# Patient Record
Sex: FEMALE | Race: WHITE | NOT HISPANIC OR LATINO | Employment: OTHER | URBAN - NONMETROPOLITAN AREA
[De-identification: names, ages, dates, MRNs, and addresses within clinical notes are randomized per-mention and may not be internally consistent; named-entity substitution may affect disease eponyms.]

---

## 2020-01-11 ENCOUNTER — APPOINTMENT (EMERGENCY)
Dept: RADIOLOGY | Facility: HOSPITAL | Age: 65
End: 2020-01-11
Payer: MEDICARE

## 2020-01-11 ENCOUNTER — HOSPITAL ENCOUNTER (EMERGENCY)
Facility: HOSPITAL | Age: 65
Discharge: HOME | End: 2020-01-11
Attending: EMERGENCY MEDICINE | Admitting: EMERGENCY MEDICINE
Payer: MEDICARE

## 2020-01-11 VITALS
DIASTOLIC BLOOD PRESSURE: 75 MMHG | HEART RATE: 59 BPM | SYSTOLIC BLOOD PRESSURE: 140 MMHG | HEIGHT: 59 IN | WEIGHT: 186 LBS | BODY MASS INDEX: 37.5 KG/M2 | OXYGEN SATURATION: 96 % | RESPIRATION RATE: 24 BRPM | TEMPERATURE: 97.2 F

## 2020-01-11 DIAGNOSIS — R06.02 SHORTNESS OF BREATH: Primary | ICD-10-CM

## 2020-01-11 LAB
ALBUMIN SERPL BCP-MCNC: 3.3 G/DL (ref 3.5–5)
ALP SERPL-CCNC: 68 U/L (ref 46–116)
ALT SERPL W P-5'-P-CCNC: 26 U/L (ref 12–78)
ANION GAP SERPL CALCULATED.3IONS-SCNC: 7 MMOL/L (ref 4–13)
AST SERPL W P-5'-P-CCNC: 14 U/L (ref 5–45)
BASOPHILS # BLD AUTO: 0.03 THOUSANDS/ΜL (ref 0–0.1)
BASOPHILS NFR BLD AUTO: 1 % (ref 0–1)
BILIRUB SERPL-MCNC: 0.55 MG/DL (ref 0.2–1)
BUN SERPL-MCNC: 15 MG/DL (ref 5–25)
CALCIUM SERPL-MCNC: 8 MG/DL (ref 8.3–10.1)
CHLORIDE SERPL-SCNC: 105 MMOL/L (ref 100–108)
CO2 SERPL-SCNC: 27 MMOL/L (ref 21–32)
CREAT SERPL-MCNC: 0.92 MG/DL (ref 0.6–1.3)
EOSINOPHIL # BLD AUTO: 0.3 THOUSAND/ΜL (ref 0–0.61)
EOSINOPHIL NFR BLD AUTO: 5 % (ref 0–6)
ERYTHROCYTE [DISTWIDTH] IN BLOOD BY AUTOMATED COUNT: 12.7 % (ref 11.6–15.1)
GFR SERPL CREATININE-BSD FRML MDRD: 66 ML/MIN/1.73SQ M
GLUCOSE SERPL-MCNC: 106 MG/DL (ref 65–140)
HCT VFR BLD AUTO: 39 % (ref 34.8–46.1)
HGB BLD-MCNC: 13 G/DL (ref 11.5–15.4)
IMM GRANULOCYTES # BLD AUTO: 0.03 THOUSAND/UL (ref 0–0.2)
IMM GRANULOCYTES NFR BLD AUTO: 1 % (ref 0–2)
LYMPHOCYTES # BLD AUTO: 1.18 THOUSANDS/ΜL (ref 0.6–4.47)
LYMPHOCYTES NFR BLD AUTO: 21 % (ref 14–44)
MCH RBC QN AUTO: 29.1 PG (ref 26.8–34.3)
MCHC RBC AUTO-ENTMCNC: 33.3 G/DL (ref 31.4–37.4)
MCV RBC AUTO: 87 FL (ref 82–98)
MONOCYTES # BLD AUTO: 0.41 THOUSAND/ΜL (ref 0.17–1.22)
MONOCYTES NFR BLD AUTO: 7 % (ref 4–12)
NEUTROPHILS # BLD AUTO: 3.6 THOUSANDS/ΜL (ref 1.85–7.62)
NEUTS SEG NFR BLD AUTO: 65 % (ref 43–75)
NRBC BLD AUTO-RTO: 0 /100 WBCS
PLATELET # BLD AUTO: 200 THOUSANDS/UL (ref 149–390)
PMV BLD AUTO: 11.2 FL (ref 8.9–12.7)
POTASSIUM SERPL-SCNC: 4.3 MMOL/L (ref 3.5–5.3)
PROT SERPL-MCNC: 6.4 G/DL (ref 6.4–8.2)
RBC # BLD AUTO: 4.47 MILLION/UL (ref 3.81–5.12)
SODIUM SERPL-SCNC: 139 MMOL/L (ref 136–145)
TROPONIN I SERPL-MCNC: <0.02 NG/ML
WBC # BLD AUTO: 5.55 THOUSAND/UL (ref 4.31–10.16)

## 2020-01-11 PROCEDURE — 93005 ELECTROCARDIOGRAM TRACING: CPT

## 2020-01-11 PROCEDURE — 99285 EMERGENCY DEPT VISIT HI MDM: CPT

## 2020-01-11 PROCEDURE — 71046 X-RAY EXAM CHEST 2 VIEWS: CPT

## 2020-01-11 PROCEDURE — 36415 COLL VENOUS BLD VENIPUNCTURE: CPT | Performed by: EMERGENCY MEDICINE

## 2020-01-11 PROCEDURE — 80053 COMPREHEN METABOLIC PANEL: CPT | Performed by: EMERGENCY MEDICINE

## 2020-01-11 PROCEDURE — 99285 EMERGENCY DEPT VISIT HI MDM: CPT | Performed by: EMERGENCY MEDICINE

## 2020-01-11 PROCEDURE — 85025 COMPLETE CBC W/AUTO DIFF WBC: CPT | Performed by: EMERGENCY MEDICINE

## 2020-01-11 PROCEDURE — 84484 ASSAY OF TROPONIN QUANT: CPT | Performed by: EMERGENCY MEDICINE

## 2020-01-11 RX ORDER — MONTELUKAST SODIUM 10 MG/1
TABLET ORAL
COMMUNITY
Start: 2019-11-13

## 2020-01-11 RX ORDER — LEVOTHYROXINE SODIUM 112 UG/1
TABLET ORAL
COMMUNITY
Start: 2019-07-23

## 2020-01-11 RX ORDER — ALBUTEROL SULFATE 90 UG/1
AEROSOL, METERED RESPIRATORY (INHALATION)
COMMUNITY
Start: 2019-11-13

## 2020-01-11 RX ORDER — BENZONATATE 100 MG/1
CAPSULE ORAL
COMMUNITY
Start: 2019-11-19

## 2020-01-11 RX ORDER — PANTOPRAZOLE SODIUM 40 MG/1
40 TABLET, DELAYED RELEASE ORAL DAILY
COMMUNITY
Start: 2020-01-08 | End: 2021-01-07

## 2020-01-11 RX ORDER — ATORVASTATIN CALCIUM 20 MG/1
TABLET, FILM COATED ORAL
COMMUNITY
Start: 2012-03-16

## 2020-01-11 RX ORDER — BUDESONIDE AND FORMOTEROL FUMARATE DIHYDRATE 160; 4.5 UG/1; UG/1
2 AEROSOL RESPIRATORY (INHALATION) 2 TIMES DAILY
COMMUNITY
Start: 2020-01-08

## 2020-01-11 RX ORDER — CLONIDINE HYDROCHLORIDE 0.1 MG/1
TABLET ORAL
COMMUNITY
Start: 2018-01-30

## 2020-01-11 RX ORDER — CETIRIZINE HYDROCHLORIDE 10 MG/1
TABLET ORAL
COMMUNITY

## 2020-01-11 NOTE — DISCHARGE INSTRUCTIONS
/75 (BP Location: Left arm)   Pulse 59   Temp (!) 97 2 °F (36 2 °C) (Temporal)   Resp (!) 24   Ht 4' 11" (1 499 m)   Wt 84 4 kg (186 lb)   SpO2 96%   BMI 37 57 kg/m²     Unsure pathology for your shortness of breath at this time  You work up is unremarkable here  Please follow up as an outpatient with your pulmonologist's orders  May consider changing CT without contrast to with       Results for orders placed or performed during the hospital encounter of 01/11/20   CBC and differential   Result Value Ref Range    WBC 5 55 4 31 - 10 16 Thousand/uL    RBC 4 47 3 81 - 5 12 Million/uL    Hemoglobin 13 0 11 5 - 15 4 g/dL    Hematocrit 39 0 34 8 - 46 1 %    MCV 87 82 - 98 fL    MCH 29 1 26 8 - 34 3 pg    MCHC 33 3 31 4 - 37 4 g/dL    RDW 12 7 11 6 - 15 1 %    MPV 11 2 8 9 - 12 7 fL    Platelets 541 663 - 034 Thousands/uL    nRBC 0 /100 WBCs    Neutrophils Relative 65 43 - 75 %    Immat GRANS % 1 0 - 2 %    Lymphocytes Relative 21 14 - 44 %    Monocytes Relative 7 4 - 12 %    Eosinophils Relative 5 0 - 6 %    Basophils Relative 1 0 - 1 %    Neutrophils Absolute 3 60 1 85 - 7 62 Thousands/µL    Immature Grans Absolute 0 03 0 00 - 0 20 Thousand/uL    Lymphocytes Absolute 1 18 0 60 - 4 47 Thousands/µL    Monocytes Absolute 0 41 0 17 - 1 22 Thousand/µL    Eosinophils Absolute 0 30 0 00 - 0 61 Thousand/µL    Basophils Absolute 0 03 0 00 - 0 10 Thousands/µL   Comprehensive metabolic panel   Result Value Ref Range    Sodium 139 136 - 145 mmol/L    Potassium 4 3 3 5 - 5 3 mmol/L    Chloride 105 100 - 108 mmol/L    CO2 27 21 - 32 mmol/L    ANION GAP 7 4 - 13 mmol/L    BUN 15 5 - 25 mg/dL    Creatinine 0 92 0 60 - 1 30 mg/dL    Glucose 106 65 - 140 mg/dL    Calcium 8 0 (L) 8 3 - 10 1 mg/dL    AST 14 5 - 45 U/L    ALT 26 12 - 78 U/L    Alkaline Phosphatase 68 46 - 116 U/L    Total Protein 6 4 6 4 - 8 2 g/dL    Albumin 3 3 (L) 3 5 - 5 0 g/dL    Total Bilirubin 0 55 0 20 - 1 00 mg/dL    eGFR 66 ml/min/1 73sq m Troponin I   Result Value Ref Range    Troponin I <0 02 <=0 04 ng/mL     Xray is unremarkable as well as your EKG

## 2020-01-11 NOTE — ED PROVIDER NOTES
History  Chief Complaint   Patient presents with    Shortness of Breath     pt had a tooth removed in november and since the surgery her asthma has been acting up and she is unable to control the breathing  Had pulm function test was normal  increased fatigue     70 yo F presenting w/dyspnea  Has seen PCP for this several times as well as pulmonology this week  Has outpt testing ordered  Hx of asthma  History provided by:  Patient   used: No    Shortness of Breath   Severity:  Mild  Onset quality:  Sudden (the day after a tooth extraction where she had anesthesia)  Duration:  2 months  Timing:  Constant  Progression:  Unchanged  Chronicity:  Recurrent (hx of asthma states this is how she always feels w/flares)  Context: activity    Context: not animal exposure, not emotional upset, not fumes, not known allergens, not occupational exposure, not pollens, not smoke exposure, not strong odors, not URI and not weather changes    Relieved by: had steroids a month ago x 1 week sx resolved then returned 2 days later  Worsened by: Activity  Ineffective treatments: inhalers  Associated symptoms: no abdominal pain, no chest pain, no cough, no diaphoresis, no ear pain, no fever, no headaches, no hemoptysis, no neck pain, no PND, no rash, no sore throat, no sputum production, no syncope, no swollen glands, no vomiting and no wheezing        Prior to Admission Medications   Prescriptions Last Dose Informant Patient Reported? Taking?    albuterol (VENTOLIN HFA) 90 mcg/act inhaler   Yes Yes   Sig: Inhale   atorvastatin (LIPITOR) 20 mg tablet   Yes Yes   Sig: Take by mouth   benzonatate (TESSALON PERLES) 100 mg capsule   Yes Yes   budesonide-formoterol (SYMBICORT) 160-4 5 mcg/act inhaler   Yes Yes   Sig: Inhale 2 puffs 2 (two) times a day   cetirizine (ZYRTEC ALLERGY) 10 mg tablet   Yes No   Sig: Take by mouth   cloNIDine (CATAPRES) 0 1 mg tablet   Yes Yes   Sig: Take by mouth   levothyroxine 112 mcg tablet   Yes Yes   Sig: Take by mouth   montelukast (SINGULAIR) 10 mg tablet   Yes Yes   Sig: Take by mouth   pantoprazole (PROTONIX) 40 mg tablet   Yes Yes   Sig: Take 40 mg by mouth daily      Facility-Administered Medications: None       Past Medical History:   Diagnosis Date    Asthma     GERD (gastroesophageal reflux disease)     Hyperlipidemia     Hypertension        Past Surgical History:   Procedure Laterality Date    HYSTERECTOMY      TOOTH EXTRACTION         History reviewed  No pertinent family history  I have reviewed and agree with the history as documented  Social History     Tobacco Use    Smoking status: Never Smoker    Smokeless tobacco: Never Used   Substance Use Topics    Alcohol use: Yes     Comment: socially    Drug use: Not Currently        Review of Systems   Constitutional: Negative for diaphoresis and fever  HENT: Negative for ear pain and sore throat  Respiratory: Positive for shortness of breath  Negative for cough, hemoptysis, sputum production, choking, chest tightness, wheezing and stridor  Cardiovascular: Negative for chest pain, syncope and PND  Gastrointestinal: Negative for abdominal pain and vomiting  Genitourinary: Negative for dysuria and flank pain  Musculoskeletal: Negative for arthralgias, back pain, gait problem, joint swelling and neck pain  Skin: Negative for rash  Neurological: Negative for tremors, seizures, syncope, weakness, numbness and headaches  Psychiatric/Behavioral: Negative for agitation and behavioral problems  Physical Exam  Physical Exam   Constitutional: She is oriented to person, place, and time  She appears well-developed and well-nourished  Non-toxic appearance  She does not appear ill  HENT:   Head: Normocephalic and atraumatic  Mouth/Throat: Oropharynx is clear and moist    Eyes: Pupils are equal, round, and reactive to light  EOM are normal    Neck: Normal range of motion  Neck supple  No JVD present   No tracheal deviation present  Cardiovascular: Normal rate, regular rhythm and normal heart sounds  Pulmonary/Chest: Effort normal and breath sounds normal  No accessory muscle usage or stridor  No tachypnea  No respiratory distress  She has no decreased breath sounds  She has no wheezes  She has no rhonchi  She has no rales  She exhibits no tenderness, no bony tenderness, no crepitus and no swelling  Abdominal: Soft  Bowel sounds are normal  She exhibits no distension  There is no tenderness  Musculoskeletal:        Right lower leg: Normal         Left lower leg: Normal    Neurological: She is alert and oriented to person, place, and time  Skin: Skin is warm  Capillary refill takes less than 2 seconds  Psychiatric: She has a normal mood and affect   Her behavior is normal        Vital Signs  ED Triage Vitals [01/11/20 0934]   Temperature Pulse Respirations Blood Pressure SpO2   (!) 97 2 °F (36 2 °C) 67 16 140/75 98 %      Temp Source Heart Rate Source Patient Position - Orthostatic VS BP Location FiO2 (%)   Temporal Monitor -- Left arm --      Pain Score       No Pain           Vitals:    01/11/20 0934 01/11/20 1000 01/11/20 1100   BP: 140/75     Pulse: 67 58 59         Visual Acuity      ED Medications  Medications - No data to display    Diagnostic Studies  Results Reviewed     Procedure Component Value Units Date/Time    Troponin I [[id number]]  (Normal) Collected:  01/11/20 0948    Lab Status:  Final result Specimen:  Blood from Line, Venous Updated:  01/11/20 1021     Troponin I <0 02 ng/mL     Comprehensive metabolic panel [[id number]]  (Abnormal) Collected:  01/11/20 0948    Lab Status:  Final result Specimen:  Blood from Line, Venous Updated:  01/11/20 1019     Sodium 139 mmol/L      Potassium 4 3 mmol/L      Chloride 105 mmol/L      CO2 27 mmol/L      ANION GAP 7 mmol/L      BUN 15 mg/dL      Creatinine 0 92 mg/dL      Glucose 106 mg/dL      Calcium 8 0 mg/dL      AST 14 U/L      ALT 26 U/L Alkaline Phosphatase 68 U/L      Total Protein 6 4 g/dL      Albumin 3 3 g/dL      Total Bilirubin 0 55 mg/dL      eGFR 66 ml/min/1 73sq m     Narrative:       [place] guidelines for Chronic Kidney Disease (CKD):     Stage 1 with normal or high GFR (GFR > 90 mL/min/1 73 square meters)    Stage 2 Mild CKD (GFR = 60-89 mL/min/1 73 square meters)    Stage 3A Moderate CKD (GFR = 45-59 mL/min/1 73 square meters)    Stage 3B Moderate CKD (GFR = 30-44 mL/min/1 73 square meters)    Stage 4 Severe CKD (GFR = 15-29 mL/min/1 73 square meters)    Stage 5 End Stage CKD (GFR <15 mL/min/1 73 square meters)  Note: GFR calculation is accurate only with a steady state creatinine    CBC and differential [[id number]] Collected:  01/11/20 0948    Lab Status:  Final result Specimen:  Blood from Line, Venous Updated:  01/11/20 1004     WBC 5 55 Thousand/uL      RBC 4 47 Million/uL      Hemoglobin 13 0 g/dL      Hematocrit 39 0 %      MCV 87 fL      MCH 29 1 pg      MCHC 33 3 g/dL      RDW 12 7 %      MPV 11 2 fL      Platelets 335 Thousands/uL      nRBC 0 /100 WBCs      Neutrophils Relative 65 %      Immat GRANS % 1 %      Lymphocytes Relative 21 %      Monocytes Relative 7 %      Eosinophils Relative 5 %      Basophils Relative 1 %      Neutrophils Absolute 3 60 Thousands/µL      Immature Grans Absolute 0 03 Thousand/uL      Lymphocytes Absolute 1 18 Thousands/µL      Monocytes Absolute 0 41 Thousand/µL      Eosinophils Absolute 0 30 Thousand/µL      Basophils Absolute 0 03 Thousands/µL                  XR chest 2 views    (Results Pending)              Procedures  ECG 12 Lead Documentation Only  Date/Time: 1/12/2020 11:41 AM  Performed by: Betty Ott MD  Authorized by: Betty Ott MD     Indications / Diagnosis:  SOB  ECG reviewed by me, the ED Provider: yes    Patient location:  ED  Previous ECG:     Previous ECG:  Unavailable    Comparison to cardiac monitor: Yes    Interpretation: Interpretation: normal    Rate:     ECG rate:  58    ECG rate assessment: bradycardic    Rhythm:     Rhythm: sinus bradycardia    Ectopy:     Ectopy: none    QRS:     QRS axis:  Normal  T waves:     T waves: flattening      Flattening:  V3             ED Course                               MDM  Number of Diagnoses or Management Options  Shortness of breath:   Diagnosis management comments: 72 yo F hx of asthma presenting w/dyspnea x 2 months  Has had PCP and pulmonology w/u thus far  Lung exam normal here  No CP associated w/this  CXR, EKG and labs unremarkable today  WELLs score low risk  DC'd w/plan for pulm f/u following already ordered outpt testing  Disposition  Final diagnoses:   Shortness of breath     Time reflects when diagnosis was documented in both MDM as applicable and the Disposition within this note     Time User Action Codes Description Comment    1/11/2020 11:13 AM Judith López [R06 02] Shortness of breath       ED Disposition     ED Disposition Condition Date/Time Comment    Discharge Stable Sat Jan 11, 2020 11:15 AM Bessy Smith discharge to home/self care              Follow-up Information    None         Discharge Medication List as of 1/11/2020 11:15 AM      CONTINUE these medications which have NOT CHANGED    Details   albuterol (VENTOLIN HFA) 90 mcg/act inhaler Inhale, Starting Wed 11/13/2019, Historical Med      atorvastatin (LIPITOR) 20 mg tablet Take by mouth, Starting Fri 3/16/2012, Historical Med      benzonatate (TESSALON PERLES) 100 mg capsule Starting Tue 11/19/2019, Historical Med      budesonide-formoterol (SYMBICORT) 160-4 5 mcg/act inhaler Inhale 2 puffs 2 (two) times a day, Starting Wed 1/8/2020, Historical Med      cloNIDine (CATAPRES) 0 1 mg tablet Take by mouth, Starting Tue 1/30/2018, Historical Med      levothyroxine 112 mcg tablet Take by mouth, Starting Tue 7/23/2019, Historical Med      montelukast (SINGULAIR) 10 mg tablet Take by mouth, Starting Wed 11/13/2019, Historical Med      pantoprazole (PROTONIX) 40 mg tablet Take 40 mg by mouth daily, Starting Wed 1/8/2020, Until Thu 1/7/2021, Historical Med      cetirizine (ZYRTEC ALLERGY) 10 mg tablet Take by mouth, Historical Med           No discharge procedures on file      ED Provider  Electronically Signed by           Chata Terrazas MD  01/11/20 200 [facility] Netta Tavares MD  01/12/20 0065

## 2020-01-13 LAB
ATRIAL RATE: 58 BPM
P AXIS: 56 DEGREES
PR INTERVAL: 178 MS
QRS AXIS: 42 DEGREES
QRSD INTERVAL: 84 MS
QT INTERVAL: 416 MS
QTC INTERVAL: 408 MS
T WAVE AXIS: 10 DEGREES
VENTRICULAR RATE: 58 BPM

## 2021-03-10 DIAGNOSIS — Z23 ENCOUNTER FOR IMMUNIZATION: ICD-10-CM

## 2022-10-07 ENCOUNTER — OFFICE VISIT (OUTPATIENT)
Dept: URGENT CARE | Facility: CLINIC | Age: 67
End: 2022-10-07
Payer: MEDICARE

## 2022-10-07 VITALS
RESPIRATION RATE: 18 BRPM | DIASTOLIC BLOOD PRESSURE: 62 MMHG | HEART RATE: 71 BPM | HEIGHT: 60 IN | SYSTOLIC BLOOD PRESSURE: 118 MMHG | TEMPERATURE: 98 F | OXYGEN SATURATION: 93 % | WEIGHT: 165 LBS | BODY MASS INDEX: 32.39 KG/M2

## 2022-10-07 DIAGNOSIS — R05.1 ACUTE COUGH: Primary | ICD-10-CM

## 2022-10-07 DIAGNOSIS — N39.0 URINARY TRACT INFECTION WITH HEMATURIA, SITE UNSPECIFIED: ICD-10-CM

## 2022-10-07 DIAGNOSIS — Z20.822 ENCOUNTER FOR LABORATORY TESTING FOR COVID-19 VIRUS: ICD-10-CM

## 2022-10-07 DIAGNOSIS — R53.83 OTHER FATIGUE: ICD-10-CM

## 2022-10-07 DIAGNOSIS — R31.9 URINARY TRACT INFECTION WITH HEMATURIA, SITE UNSPECIFIED: ICD-10-CM

## 2022-10-07 LAB
SARS-COV-2 AG UPPER RESP QL IA: NEGATIVE
SL AMB  POCT GLUCOSE, UA: NEGATIVE
SL AMB LEUKOCYTE ESTERASE,UA: ABNORMAL
SL AMB POCT BILIRUBIN,UA: NEGATIVE
SL AMB POCT BLOOD,UA: NEGATIVE
SL AMB POCT CLARITY,UA: ABNORMAL
SL AMB POCT COLOR,UA: YELLOW
SL AMB POCT KETONES,UA: NEGATIVE
SL AMB POCT NITRITE,UA: NEGATIVE
SL AMB POCT PH,UA: 5
SL AMB POCT SPECIFIC GRAVITY,UA: 1.02
SL AMB POCT URINE PROTEIN: ABNORMAL
SL AMB POCT UROBILINOGEN: 0.2
VALID CONTROL: NORMAL

## 2022-10-07 PROCEDURE — G0463 HOSPITAL OUTPT CLINIC VISIT: HCPCS | Performed by: PHYSICIAN ASSISTANT

## 2022-10-07 PROCEDURE — 87811 SARS-COV-2 COVID19 W/OPTIC: CPT | Performed by: PHYSICIAN ASSISTANT

## 2022-10-07 PROCEDURE — 99213 OFFICE O/P EST LOW 20 MIN: CPT | Performed by: PHYSICIAN ASSISTANT

## 2022-10-07 PROCEDURE — 81002 URINALYSIS NONAUTO W/O SCOPE: CPT | Performed by: PHYSICIAN ASSISTANT

## 2022-10-07 RX ORDER — ANASTROZOLE 1 MG/1
1 TABLET ORAL DAILY
COMMUNITY
Start: 2022-08-09

## 2022-10-07 RX ORDER — CARVEDILOL 12.5 MG/1
12.5 TABLET ORAL DAILY
COMMUNITY
Start: 2022-10-06

## 2022-10-07 NOTE — PROGRESS NOTES
[facility]        NAME: Nellene Mcburney is a 75 y o  female  : 1955    MRN: [id number]  DATE: 2022  TIME: 10:32 AM    /62   Pulse 71   Temp 98 °F (36 7 °C)   Resp 18   Ht 5' (1 524 m)   Wt 74 8 kg (165 lb)   SpO2 93%   BMI 32 22 kg/m²     Assessment and Plan   Acute cough [R05 1]  1  Acute cough  Poct Covid 19 Rapid Antigen Test   2  Urinary tract infection with hematuria, site unspecified  POCT urine dip   3  Other fatigue     4  Encounter for laboratory testing for COVID-19 virus           Patient Instructions       Follow up with PCP in 3-5 days  Proceed to  ER if symptoms worsen  Chief Complaint     Chief Complaint   Patient presents with    Cold Like Symptoms     C/O nasal congestion and slight cough and severe fatigue  Patient currently undergoing CA treatments and concerned about COVID  Onset of symptoms 3 days   Urinary Frequency     C/o urinary frequency and burning at times for 4 days now  History of Present Illness       Pt with minor cough and clear nasal congestion for several days, pt also with severe fatigue , pt also with urine frequency       Review of Systems   Review of Systems   Constitutional: Negative  HENT: Positive for congestion  Eyes: Negative  Respiratory: Positive for cough  Cardiovascular: Negative  Gastrointestinal: Negative  Endocrine: Negative  Genitourinary: Negative  Musculoskeletal: Negative  Skin: Negative  Allergic/Immunologic: Negative  Neurological: Negative  Hematological: Negative  Psychiatric/Behavioral: Negative  All other systems reviewed and are negative          Current Medications       Current Outpatient Medications:     anastrozole (ARIMIDEX) 1 mg tablet, Take 1 mg by mouth daily, Disp: , Rfl:     atorvastatin (LIPITOR) 20 mg tablet, Take by mouth, Disp: , Rfl:     carvedilol (COREG) 12 5 mg tablet, Take 12 5 mg by mouth daily, Disp: , Rfl:     cetirizine (ZyrTEC) 10 mg tablet, Take by mouth, Disp: , Rfl:     levothyroxine 112 mcg tablet, Take by mouth, Disp: , Rfl:     montelukast (SINGULAIR) 10 mg tablet, Take by mouth, Disp: , Rfl:     albuterol (PROVENTIL HFA,VENTOLIN HFA) 90 mcg/act inhaler, Inhale (Patient not taking: Reported on 10/7/2022), Disp: , Rfl:     benzonatate (TESSALON PERLES) 100 mg capsule, , Disp: , Rfl:     budesonide-formoterol (SYMBICORT) 160-4 5 mcg/act inhaler, Inhale 2 puffs 2 (two) times a day (Patient not taking: Reported on 10/7/2022), Disp: , Rfl:     cloNIDine (CATAPRES) 0 1 mg tablet, Take by mouth (Patient not taking: Reported on 10/7/2022), Disp: , Rfl:     pantoprazole (PROTONIX) 40 mg tablet, Take 40 mg by mouth daily, Disp: , Rfl:     Current Allergies     Allergies as of 10/07/2022 - Reviewed 10/07/2022   Allergen Reaction Noted    Lisinopril Shortness Of Breath 01/11/2020    Penicillins Anaphylaxis 01/11/2020    Chlorhexidine Itching 04/13/2022    Losartan Other (See Comments) 06/03/2022            The following portions of the patient's history were reviewed and updated as appropriate: allergies, current medications, past family history, past medical history, past social history, past surgical history and problem list      Past Medical History:   Diagnosis Date    Asthma     [facility]     [facility]     GERD (gastroesophageal reflux disease)     Hyperlipidemia     Hypertension        Past Surgical History:   Procedure Laterality Date    BREAST SURGERY      HYSTERECTOMY      TOOTH EXTRACTION         Family History   Problem Relation Age of Onset    Kidney disease Mother     Heart disease Mother     Cancer Father          Medications have been verified  Objective   /62   Pulse 71   Temp 98 °F (36 7 °C)   Resp 18   Ht 5' (1 524 m)   Wt 74 8 kg (165 lb)   SpO2 93%   BMI 32 22 kg/m²        Physical Exam     Physical Exam  Vitals and nursing note reviewed     Constitutional:       Appearance: Normal appearance  She is normal weight  Comments: Fatigue,   Pt will go to er for further evaluation with breast cancer and med hx and age for ,  Blood tests blood culture etc    HENT:      Head: Normocephalic and atraumatic  Right Ear: Tympanic membrane normal       Nose: Nose normal       Mouth/Throat:      Mouth: Mucous membranes are moist       Pharynx: Oropharynx is clear  Eyes:      Extraocular Movements: Extraocular movements intact  Conjunctiva/sclera: Conjunctivae normal       Pupils: Pupils are equal, round, and reactive to light  Cardiovascular:      Rate and Rhythm: Normal rate and regular rhythm  Pulses: Normal pulses  Heart sounds: Normal heart sounds  Pulmonary:      Effort: Pulmonary effort is normal       Breath sounds: Normal breath sounds  Abdominal:      General: Abdomen is flat  Bowel sounds are normal       Palpations: Abdomen is soft  Musculoskeletal:         General: Normal range of motion  Cervical back: Normal range of motion and neck supple  Skin:     General: Skin is warm  Neurological:      General: No focal deficit present  Mental Status: She is alert and oriented to person, place, and time     Psychiatric:         Mood and Affect: Mood normal          Behavior: Behavior normal

## 2023-06-30 ENCOUNTER — HOSPITAL ENCOUNTER (EMERGENCY)
Facility: HOSPITAL | Age: 68
Discharge: HOME | End: 2023-06-30
Attending: EMERGENCY MEDICINE
Payer: MEDICARE

## 2023-06-30 ENCOUNTER — APPOINTMENT (EMERGENCY)
Dept: CT IMAGING | Facility: HOSPITAL | Age: 68
End: 2023-06-30
Payer: MEDICARE

## 2023-06-30 VITALS
DIASTOLIC BLOOD PRESSURE: 55 MMHG | HEIGHT: 60 IN | SYSTOLIC BLOOD PRESSURE: 116 MMHG | BODY MASS INDEX: 30.64 KG/M2 | OXYGEN SATURATION: 95 % | WEIGHT: 156.09 LBS | HEART RATE: 63 BPM | RESPIRATION RATE: 16 BRPM | TEMPERATURE: 97.2 F

## 2023-06-30 DIAGNOSIS — N39.0 UTI (URINARY TRACT INFECTION): Primary | ICD-10-CM

## 2023-06-30 LAB
ALBUMIN SERPL BCP-MCNC: 3.7 G/DL (ref 3.5–5)
ALP SERPL-CCNC: 58 U/L (ref 34–104)
ALT SERPL W P-5'-P-CCNC: 21 U/L (ref 7–52)
ANION GAP SERPL CALCULATED.3IONS-SCNC: 7 MMOL/L
AST SERPL W P-5'-P-CCNC: 28 U/L (ref 13–39)
BACTERIA UR QL AUTO: ABNORMAL /HPF
BASOPHILS # BLD AUTO: 0.04 THOUSANDS/ÂΜL (ref 0–0.1)
BASOPHILS NFR BLD AUTO: 1 % (ref 0–1)
BILIRUB SERPL-MCNC: 1 MG/DL (ref 0.2–1)
BILIRUB UR QL STRIP: NEGATIVE
BUN SERPL-MCNC: 22 MG/DL (ref 5–25)
CALCIUM SERPL-MCNC: 9.2 MG/DL (ref 8.4–10.2)
CHLORIDE SERPL-SCNC: 105 MMOL/L (ref 96–108)
CLARITY UR: CLEAR
CO2 SERPL-SCNC: 27 MMOL/L (ref 21–32)
COLOR UR: YELLOW
CREAT SERPL-MCNC: 1.12 MG/DL (ref 0.6–1.3)
EOSINOPHIL # BLD AUTO: 0.45 THOUSAND/ÂΜL (ref 0–0.61)
EOSINOPHIL NFR BLD AUTO: 9 % (ref 0–6)
ERYTHROCYTE [DISTWIDTH] IN BLOOD BY AUTOMATED COUNT: 14 % (ref 11.6–15.1)
GFR SERPL CREATININE-BSD FRML MDRD: 50 ML/MIN/1.73SQ M
GLUCOSE SERPL-MCNC: 108 MG/DL (ref 65–140)
GLUCOSE UR STRIP-MCNC: NEGATIVE MG/DL
HCT VFR BLD AUTO: 36.6 % (ref 34.8–46.1)
HGB BLD-MCNC: 12 G/DL (ref 11.5–15.4)
HGB UR QL STRIP.AUTO: NEGATIVE
IMM GRANULOCYTES # BLD AUTO: 0.02 THOUSAND/UL (ref 0–0.2)
IMM GRANULOCYTES NFR BLD AUTO: 0 % (ref 0–2)
KETONES UR STRIP-MCNC: NEGATIVE MG/DL
LACTATE SERPL-SCNC: 0.8 MMOL/L (ref 0.5–2)
LEUKOCYTE ESTERASE UR QL STRIP: ABNORMAL
LYMPHOCYTES # BLD AUTO: 0.82 THOUSANDS/ÂΜL (ref 0.6–4.47)
LYMPHOCYTES NFR BLD AUTO: 17 % (ref 14–44)
MCH RBC QN AUTO: 29.3 PG (ref 26.8–34.3)
MCHC RBC AUTO-ENTMCNC: 32.8 G/DL (ref 31.4–37.4)
MCV RBC AUTO: 90 FL (ref 82–98)
MONOCYTES # BLD AUTO: 0.39 THOUSAND/ÂΜL (ref 0.17–1.22)
MONOCYTES NFR BLD AUTO: 8 % (ref 4–12)
NEUTROPHILS # BLD AUTO: 3.05 THOUSANDS/ÂΜL (ref 1.85–7.62)
NEUTS SEG NFR BLD AUTO: 65 % (ref 43–75)
NITRITE UR QL STRIP: NEGATIVE
NON-SQ EPI CELLS URNS QL MICRO: ABNORMAL /HPF
NRBC BLD AUTO-RTO: 0 /100 WBCS
PH UR STRIP.AUTO: 7 [PH]
PLATELET # BLD AUTO: 111 THOUSANDS/UL (ref 149–390)
PMV BLD AUTO: 11.1 FL (ref 8.9–12.7)
POTASSIUM SERPL-SCNC: 3.9 MMOL/L (ref 3.5–5.3)
PROT SERPL-MCNC: 6.6 G/DL (ref 6.4–8.4)
PROT UR STRIP-MCNC: NEGATIVE MG/DL
RBC # BLD AUTO: 4.09 MILLION/UL (ref 3.81–5.12)
RBC #/AREA URNS AUTO: ABNORMAL /HPF
SODIUM SERPL-SCNC: 139 MMOL/L (ref 135–147)
SP GR UR STRIP.AUTO: 1.01 (ref 1–1.03)
UROBILINOGEN UR QL STRIP.AUTO: 0.2 E.U./DL
WBC # BLD AUTO: 4.77 THOUSAND/UL (ref 4.31–10.16)
WBC #/AREA URNS AUTO: ABNORMAL /HPF
WBC CLUMPS # UR AUTO: PRESENT /UL

## 2023-06-30 PROCEDURE — 36415 COLL VENOUS BLD VENIPUNCTURE: CPT | Performed by: PHYSICIAN ASSISTANT

## 2023-06-30 PROCEDURE — 80053 COMPREHEN METABOLIC PANEL: CPT | Performed by: PHYSICIAN ASSISTANT

## 2023-06-30 PROCEDURE — 74177 CT ABD & PELVIS W/CONTRAST: CPT

## 2023-06-30 PROCEDURE — G1004 CDSM NDSC: HCPCS

## 2023-06-30 PROCEDURE — 83605 ASSAY OF LACTIC ACID: CPT | Performed by: PHYSICIAN ASSISTANT

## 2023-06-30 PROCEDURE — 87086 URINE CULTURE/COLONY COUNT: CPT | Performed by: PHYSICIAN ASSISTANT

## 2023-06-30 PROCEDURE — 85025 COMPLETE CBC W/AUTO DIFF WBC: CPT | Performed by: PHYSICIAN ASSISTANT

## 2023-06-30 PROCEDURE — 81001 URINALYSIS AUTO W/SCOPE: CPT | Performed by: PHYSICIAN ASSISTANT

## 2023-06-30 RX ORDER — SULFAMETHOXAZOLE AND TRIMETHOPRIM 800; 160 MG/1; MG/1
1 TABLET ORAL 2 TIMES DAILY
Qty: 10 TABLET | Refills: 0 | Status: SHIPPED | OUTPATIENT
Start: 2023-06-30 | End: 2023-06-30 | Stop reason: SDUPTHER

## 2023-06-30 RX ORDER — SULFAMETHOXAZOLE AND TRIMETHOPRIM 800; 160 MG/1; MG/1
1 TABLET ORAL ONCE
Status: COMPLETED | OUTPATIENT
Start: 2023-06-30 | End: 2023-06-30

## 2023-06-30 RX ORDER — CIPROFLOXACIN 500 MG/1
500 TABLET, FILM COATED ORAL ONCE
Status: DISCONTINUED | OUTPATIENT
Start: 2023-06-30 | End: 2023-06-30

## 2023-06-30 RX ORDER — ONDANSETRON 2 MG/ML
4 INJECTION INTRAMUSCULAR; INTRAVENOUS ONCE
Status: COMPLETED | OUTPATIENT
Start: 2023-06-30 | End: 2023-06-30

## 2023-06-30 RX ORDER — SULFAMETHOXAZOLE AND TRIMETHOPRIM 800; 160 MG/1; MG/1
1 TABLET ORAL 2 TIMES DAILY
Qty: 10 TABLET | Refills: 0 | Status: SHIPPED | OUTPATIENT
Start: 2023-06-30 | End: 2023-07-05

## 2023-06-30 RX ADMIN — SULFAMETHOXAZOLE AND TRIMETHOPRIM 1 TABLET: 800; 160 TABLET ORAL at 14:41

## 2023-06-30 RX ADMIN — SODIUM CHLORIDE 1000 ML: 0.9 INJECTION, SOLUTION INTRAVENOUS at 11:50

## 2023-06-30 RX ADMIN — IOHEXOL 100 ML: 350 INJECTION, SOLUTION INTRAVENOUS at 12:33

## 2023-06-30 RX ADMIN — ONDANSETRON 4 MG: 2 INJECTION INTRAMUSCULAR; INTRAVENOUS at 11:53

## 2023-06-30 NOTE — DISCHARGE INSTRUCTIONS
You have a urinary tract infection  Please take the antibiotics as prescribed and follow-up with your family doctor to ensure that this clears  Additionally there was multiple incidental findings on your CAT scan today and I recommend that you review this with your family doctor or oncologist   Please return immediately with any new or worsening symptoms  CT abdomen pelvis with contrast   Final Result      1  No CT evidence of acute process within the abdomen or pelvis  2  Enhancing focus along the anterior aspect of the right hepatic lobe measuring 1 2 x 0 8 cm  This finding is indeterminate with differential considerations including hemangioma versus perfusion abnormality  Additional subcentimeter hypodensities are    seen throughout the liver, too small to characterize  Further characterization of these findings utilizing multiphase CT or MRI liver protocol is recommended given patient's history of breast cancer  Correlation with any prior outside imaging would be of    benefit to establish stability  3  Left lower lobe pulmonary nodule measuring 8 mm  Given patient's history of breast cancer, nonemergent follow-up with CT chest is recommended to investigate for the presence of any pulmonary nodules in the excluded portions of the lungs  Any available    prior cross-sectional imaging of the chest would be of benefit to establish stability of this nodule  4  Small hiatal hernia  5  Small bilateral inguinal hernias, right larger than left  Trace fluid is noted within the right-sided hernia  The study was marked in [facility] for immediate notification        Workstation performed: [id number]

## 2023-06-30 NOTE — ED PROVIDER NOTES
"History  Chief Complaint   Patient presents with   • Abdominal Pain     RLQ pain that started about an hour ago, refers to it as \"gas pain\" did not take anything for it  21-year-old female presents the emergency department for evaluation of right lower quadrant abdominal pain  Patient states this has been intermittent since stopping \"hard chemo\" several months ago  States today pain seem to be worse  Reports she was outside in the heat  Not take anything for the pain  She admits to mild nausea  Denies vomiting  Denies diarrhea or constipation  Patient reports normal appetite  She denies fevers or chills  Denies any dysuria, hematuria or urinary frequency  Pain is nonradiating  Denies any pain in the flank of the back  Prior to Admission Medications   Prescriptions Last Dose Informant Patient Reported? Taking?    albuterol (PROVENTIL HFA,VENTOLIN HFA) 90 mcg/act inhaler   Yes No   Sig: Inhale   Patient not taking: Reported on 10/7/2022   anastrozole (ARIMIDEX) 1 mg tablet   Yes No   Sig: Take 1 mg by mouth daily   atorvastatin (LIPITOR) 20 mg tablet   Yes No   Sig: Take by mouth   benzonatate (TESSALON PERLES) 100 mg capsule   Yes No   Patient not taking: Reported on 10/7/2022   budesonide-formoterol (SYMBICORT) 160-4 5 mcg/act inhaler   Yes No   Sig: Inhale 2 puffs 2 (two) times a day   Patient not taking: Reported on 10/7/2022   carvedilol (COREG) 12 5 mg tablet   Yes No   Sig: Take 12 5 mg by mouth daily   cetirizine (ZyrTEC) 10 mg tablet   Yes No   Sig: Take by mouth   cloNIDine (CATAPRES) 0 1 mg tablet   Yes No   Sig: Take by mouth   Patient not taking: Reported on 10/7/2022   levothyroxine 112 mcg tablet   Yes No   Sig: Take by mouth   montelukast (SINGULAIR) 10 mg tablet   Yes No   Sig: Take by mouth   pantoprazole (PROTONIX) 40 mg tablet   Yes No   Sig: Take 40 mg by mouth daily      Facility-Administered Medications: None       Past Medical History:   Diagnosis Date   • Asthma    • " Breast CA ([facility] [address] )    • Cancer (HCC)    • GERD (gastroesophageal reflux disease)    • Hyperlipidemia    • Hypertension        Past Surgical History:   Procedure Laterality Date   • BREAST SURGERY     • HYSTERECTOMY     • TOOTH EXTRACTION         Family History   Problem Relation Age of Onset   • Kidney disease Mother    • Heart disease Mother    • Cancer Father      I have reviewed and agree with the history as documented  E-Cigarette/Vaping   • E-Cigarette Use Never User      E-Cigarette/Vaping Substances     Social History     Tobacco Use   • Smoking status: Never   • Smokeless tobacco: Never   Vaping Use   • Vaping Use: Never used   Substance Use Topics   • Alcohol use: Yes     Comment: socially   • Drug use: Not Currently       Review of Systems   Constitutional: Negative for appetite change, fatigue and fever  Respiratory: Negative for shortness of breath  Cardiovascular: Negative for chest pain, palpitations and leg swelling  Gastrointestinal: Positive for abdominal pain and nausea  Negative for diarrhea and vomiting  Genitourinary: Negative  Negative for flank pain  Musculoskeletal: Negative  Negative for back pain  Skin: Negative  Neurological: Negative  All other systems reviewed and are negative  Physical Exam  Physical Exam  Vitals and nursing note reviewed  Constitutional:       General: She is not in acute distress  Appearance: She is well-developed  She is not ill-appearing, toxic-appearing or diaphoretic  HENT:      Head: Normocephalic  Mouth/Throat:      Mouth: Mucous membranes are moist    Eyes:      Extraocular Movements: Extraocular movements intact  Cardiovascular:      Rate and Rhythm: Normal rate and regular rhythm  Pulmonary:      Effort: Pulmonary effort is normal  No respiratory distress  Breath sounds: No wheezing or rales  Chest:      Chest wall: No tenderness  Abdominal:      Tenderness:  There is abdominal tenderness in the right lower quadrant  There is no right CVA tenderness or left CVA tenderness  Skin:     General: Skin is warm and dry  Findings: No rash  Neurological:      General: No focal deficit present  Mental Status: She is alert and oriented to person, place, and time  Psychiatric:         Mood and Affect: Mood normal          Behavior: Behavior normal          Vital Signs  ED Triage Vitals   Temperature Pulse Respirations Blood Pressure SpO2   06/30/23 1137 06/30/23 1137 06/30/23 1137 06/30/23 1137 06/30/23 1137   (!) 97 2 °F (36 2 °C) (!) 54 18 (!) 82/52 96 %      Temp Source Heart Rate Source Patient Position - Orthostatic VS BP Location FiO2 (%)   06/30/23 1137 06/30/23 1137 06/30/23 1155 06/30/23 1137 --   Temporal Monitor Lying Right arm       Pain Score       06/30/23 1137       7           Vitals:    06/30/23 1257 06/30/23 1324 06/30/23 1345 06/30/23 1445   BP: 116/55  115/63 116/55   Pulse: 60 55 57 63   Patient Position - Orthostatic VS: Lying  Lying Lying         Visual Acuity      ED Medications  Medications   sodium chloride 0 9 % bolus 1,000 mL (0 mL Intravenous Stopped 6/30/23 1250)   ondansetron (ZOFRAN) injection 4 mg (4 mg Intravenous Given 6/30/23 1153)   iohexol (OMNIPAQUE) 350 MG/ML injection (SINGLE-DOSE) 100 mL (100 mL Intravenous Given 6/30/23 1233)   sulfamethoxazole-trimethoprim (BACTRIM DS) 800-160 mg per tablet 1 tablet (1 tablet Oral Given 6/30/23 1441)       Diagnostic Studies  Results Reviewed     Procedure Component Value Units Date/Time    Urine Microscopic [[id number]]  (Abnormal) Collected: 06/30/23 1319    Lab Status: Final result Specimen: Urine, Clean Catch Updated: 06/30/23 1344     RBC, UA None Seen /hpf      WBC, UA 30-50 /hpf      Epithelial Cells Occasional /hpf      Bacteria, UA Occasional /hpf      WBC Clumps present    Urine culture [[id number]] Collected: 06/30/23 1319    Lab Status:  In process Specimen: Urine, Clean Catch Updated: 06/30/23 1344    UA w Reflex to Microscopic w Reflex to Culture [[id number]]  (Abnormal) Collected: 06/30/23 1319    Lab Status: Final result Specimen: Urine, Clean Catch Updated: 06/30/23 1335     Color, UA Yellow     Clarity, UA Clear     Specific Gravity, UA 1 010     pH, UA 7 0     Leukocytes, UA Moderate     Nitrite, UA Negative     Protein, UA Negative mg/dl      Glucose, UA Negative mg/dl      Ketones, UA Negative mg/dl      Urobilinogen, UA 0 2 E U /dl      Bilirubin, UA Negative     Occult Blood, UA Negative    Lactic acid, plasma (w/reflex if result > 2 0) [[id number]]  (Normal) Collected: 06/30/23 1152    Lab Status: Final result Specimen: Blood from Arm, Right Updated: 06/30/23 1217     LACTIC ACID 0 8 mmol/L     Narrative:      Result may be elevated if tourniquet was used during collection      Comprehensive metabolic panel [[id number]] Collected: 06/30/23 1152    Lab Status: Final result Specimen: Blood from Arm, Right Updated: 06/30/23 1216     Sodium 139 mmol/L      Potassium 3 9 mmol/L      Chloride 105 mmol/L      CO2 27 mmol/L      ANION GAP 7 mmol/L      BUN 22 mg/dL      Creatinine 1 12 mg/dL      Glucose 108 mg/dL      Calcium 9 2 mg/dL      AST 28 U/L      ALT 21 U/L      Alkaline Phosphatase 58 U/L      Total Protein 6 6 g/dL      Albumin 3 7 g/dL      Total Bilirubin 1 00 mg/dL      eGFR 50 ml/min/1 73sq m     Narrative:      [place] guidelines for Chronic Kidney Disease (CKD):   •  Stage 1 with normal or high GFR (GFR > 90 mL/min/1 73 square meters)  •  Stage 2 Mild CKD (GFR = 60-89 mL/min/1 73 square meters)  •  Stage 3A Moderate CKD (GFR = 45-59 mL/min/1 73 square meters)  •  Stage 3B Moderate CKD (GFR = 30-44 mL/min/1 73 square meters)  •  Stage 4 Severe CKD (GFR = 15-29 mL/min/1 73 square meters)  •  Stage 5 End Stage CKD (GFR <15 mL/min/1 73 square meters)  Note: GFR calculation is accurate only with a steady state creatinine    CBC and differential [[id number]]  (Abnormal) Collected: 06/30/23 1152    Lab Status: Final result Specimen: Blood from Arm, Right Updated: 06/30/23 1209     WBC 4 77 Thousand/uL      RBC 4 09 Million/uL      Hemoglobin 12 0 g/dL      Hematocrit 36 6 %      MCV 90 fL      MCH 29 3 pg      MCHC 32 8 g/dL      RDW 14 0 %      MPV 11 1 fL      Platelets 835 Thousands/uL      nRBC 0 /100 WBCs      Neutrophils Relative 65 %      Immat GRANS % 0 %      Lymphocytes Relative 17 %      Monocytes Relative 8 %      Eosinophils Relative 9 %      Basophils Relative 1 %      Neutrophils Absolute 3 05 Thousands/µL      Immature Grans Absolute 0 02 Thousand/uL      Lymphocytes Absolute 0 82 Thousands/µL      Monocytes Absolute 0 39 Thousand/µL      Eosinophils Absolute 0 45 Thousand/µL      Basophils Absolute 0 04 Thousands/µL                  CT abdomen pelvis with contrast   Final Result by Joseph Johnson DO (06/30 9811)      1  No CT evidence of acute process within the abdomen or pelvis  2  Enhancing focus along the anterior aspect of the right hepatic lobe measuring 1 2 x 0 8 cm  This finding is indeterminate with differential considerations including hemangioma versus perfusion abnormality  Additional subcentimeter hypodensities are    seen throughout the liver, too small to characterize  Further characterization of these findings utilizing multiphase CT or MRI liver protocol is recommended given patient's history of breast cancer  Correlation with any prior outside imaging would be of    benefit to establish stability  3  Left lower lobe pulmonary nodule measuring 8 mm  Given patient's history of breast cancer, nonemergent follow-up with CT chest is recommended to investigate for the presence of any pulmonary nodules in the excluded portions of the lungs  Any available    prior cross-sectional imaging of the chest would be of benefit to establish stability of this nodule  4  Small hiatal hernia  5  Small bilateral inguinal hernias, right larger than left   Trace fluid is noted within the right-sided hernia  The study was marked in [facility] for immediate notification  Workstation performed: [id number]                    Procedures  Procedures         ED Course  ED Course as of 06/30/23 1736   Fri Jun 30, 2023   1154 Blood Pressure(!): 82/52   1215 WBC: 4 77   1221 Blood Pressure: 103/55   1221 LACTIC ACID: 0 8   1221 CMP unremarkable   1300 Patient feeling significantly improved  Requesting food, will hold off until CAT scan is read  1351 Leukocytes, UA(!): Moderate   1351 WBC, UA(!): 30-50   1351 UA concerning for UTI   1518 CT abd: 1  No CT evidence of acute process within the abdomen or pelvis      2  Enhancing focus along the anterior aspect of the right hepatic lobe measuring 1 2 x 0 8 cm  This finding is indeterminate with differential considerations including hemangioma versus perfusion abnormality  Additional subcentimeter hypodensities are   seen throughout the liver, too small to characterize  Further characterization of these findings utilizing multiphase CT or MRI liver protocol is recommended given patient's history of breast cancer  Correlation with any prior outside imaging would be of   benefit to establish stability      3  Left lower lobe pulmonary nodule measuring 8 mm  Given patient's history of breast cancer, nonemergent follow-up with CT chest is recommended to investigate for the presence of any pulmonary nodules in the excluded portions of the lungs  Any available   prior cross-sectional imaging of the chest would be of benefit to establish stability of this nodule      4  Small hiatal hernia      5  Small bilateral inguinal hernias, right larger than left  Trace fluid is noted within the right-sided hernia  1524 I discussed results and findings with the patient  Patient's daughter is now at bedside who is a nurse practitioner    We discussed incidental CT findings and daughter states they were previously aware of all of these findings and have appropriate follow-up  Discussed appropriate follow-up and return precautions  We discussed taking antibiotics as prescribed  We also discussed hypotension on arrival   Patient and daughter verbalized understanding  SBIRT 22yo+    Flowsheet Row Most Recent Value   Initial Alcohol Screen: US AUDIT-C     1  How often do you have a drink containing alcohol? 0 Filed at: 06/30/2023 1141   2  How many drinks containing alcohol do you have on a typical day you are drinking? 0 Filed at: 06/30/2023 1141   3b  FEMALE Any Age, or MALE 65+: How often do you have 4 or more drinks on one occassion? 0 Filed at: 06/30/2023 1141   Audit-C Score 0 Filed at: 06/30/2023 1141   YOLIS: How many times in the past year have you    Used an illegal drug or used a prescription medication for non-medical reasons? Never Filed at: 06/30/2023 1141                    Medical Decision Making  54-year-old female presented to the emergency department for evaluation of right lower quadrant abdominal pain intermittent for months worsening today  Vitals and medical record reviewed  On exam she had right lower quadrant abdominal tenderness to palpation  No CVA tenderness  No leukocytosis or lactic acidosis  CMP unremarkable  Patient was found to have UTI and started on Bactrim  Skin negative for acute intra-abdominal abnormalities  We discussed incidental findings and she verbalized understanding  Patient is agreeable to follow-up with PCP  Return precautions were discussed and she verbalized understanding  Patient was clinically and hemodynamically stable for discharge    UTI (urinary tract infection): acute illness or injury  Amount and/or Complexity of Data Reviewed  Labs: ordered  Decision-making details documented in ED Course  Radiology: ordered  Risk  Prescription drug management            Disposition  Final diagnoses:   UTI (urinary tract infection)     Time reflects when diagnosis was documented in both MDM as applicable and the Disposition within this note     Time User Action Codes Description Comment    6/30/2023  3:18 PM Vanessa Scott Add [N39 0] UTI (urinary tract infection)       ED Disposition     ED Disposition   Discharge    Condition   Stable    Date/Time   Fri Jun 30, 2023  3:18 PM    Comment   Pete Abernathy discharge to home/self care                 Follow-up Information     Follow up With Specialties Details Why Contact Info    Pam Sharma MD Internal Medicine In 1 week  [address] [place] [id number]  [phone]            Discharge Medication List as of 6/30/2023  3:20 PM      START taking these medications    Details   sulfamethoxazole-trimethoprim (BACTRIM DS) 800-160 mg per tablet Take 1 tablet by mouth 2 (two) times a day for 5 days smx-tmp DS (BACTRIM) 800-160 mg tabs (1tab q12 D10), Starting Fri 6/30/2023, Until Wed 7/5/2023, Normal         CONTINUE these medications which have NOT CHANGED    Details   albuterol (PROVENTIL HFA,VENTOLIN HFA) 90 mcg/act inhaler Inhale, Starting Wed 11/13/2019, Historical Med      anastrozole (ARIMIDEX) 1 mg tablet Take 1 mg by mouth daily, Starting Tue 8/9/2022, Historical Med      atorvastatin (LIPITOR) 20 mg tablet Take by mouth, Starting Fri 3/16/2012, Historical Med      benzonatate (TESSALON PERLES) 100 mg capsule Starting Tue 11/19/2019, Historical Med      budesonide-formoterol (SYMBICORT) 160-4 5 mcg/act inhaler Inhale 2 puffs 2 (two) times a day, Starting Wed 1/8/2020, Historical Med      carvedilol (COREG) 12 5 mg tablet Take 12 5 mg by mouth daily, Starting Thu 10/6/2022, Historical Med      cetirizine (ZyrTEC) 10 mg tablet Take by mouth, Historical Med      cloNIDine (CATAPRES) 0 1 mg tablet Take by mouth, Starting Tue 1/30/2018, Historical Med      levothyroxine 112 mcg tablet Take by mouth, Starting Tue 7/23/2019, Historical Med      montelukast (SINGULAIR) 10 mg tablet Take by mouth, Starting Wed 11/13/2019, Historical Med      pantoprazole (PROTONIX) 40 mg tablet Take 40 mg by mouth daily, Starting Wed 1/8/2020, Until Thu 1/7/2021, Historical Med             No discharge procedures on file      PDMP Review     None          ED Provider  Electronically Signed by           Radha Dahl PA-C  06/30/23 6031

## 2023-07-01 LAB — BACTERIA UR CULT: NORMAL

## 2023-12-08 ENCOUNTER — TRANSCRIBE ORDERS (OUTPATIENT)
Dept: CARDIOLOGY CLINIC | Facility: CLINIC | Age: 68
End: 2023-12-08

## 2023-12-08 DIAGNOSIS — N39.0 URINARY TRACT INFECTION WITHOUT HEMATURIA, SITE UNSPECIFIED: Primary | ICD-10-CM

## 2023-12-22 RX ORDER — OXYCODONE HYDROCHLORIDE 5 MG/1
TABLET ORAL
COMMUNITY
Start: 2023-08-16

## 2023-12-26 ENCOUNTER — OFFICE VISIT (OUTPATIENT)
Dept: UROLOGY | Facility: CLINIC | Age: 68
End: 2023-12-26
Payer: MEDICARE

## 2023-12-26 VITALS
SYSTOLIC BLOOD PRESSURE: 128 MMHG | WEIGHT: 160.2 LBS | OXYGEN SATURATION: 99 % | BODY MASS INDEX: 31.45 KG/M2 | HEART RATE: 66 BPM | TEMPERATURE: 98 F | DIASTOLIC BLOOD PRESSURE: 66 MMHG | HEIGHT: 60 IN

## 2023-12-26 DIAGNOSIS — N39.0 URINARY TRACT INFECTION WITHOUT HEMATURIA, SITE UNSPECIFIED: Primary | ICD-10-CM

## 2023-12-26 DIAGNOSIS — R31.29 MICROSCOPIC HEMATURIA: ICD-10-CM

## 2023-12-26 LAB
SL AMB  POCT GLUCOSE, UA: ABNORMAL
SL AMB LEUKOCYTE ESTERASE,UA: ABNORMAL
SL AMB POCT BILIRUBIN,UA: ABNORMAL
SL AMB POCT BLOOD,UA: ABNORMAL
SL AMB POCT CLARITY,UA: CLEAR
SL AMB POCT COLOR,UA: YELLOW
SL AMB POCT KETONES,UA: ABNORMAL
SL AMB POCT NITRITE,UA: ABNORMAL
SL AMB POCT PH,UA: 6
SL AMB POCT SPECIFIC GRAVITY,UA: 1.01
SL AMB POCT URINE PROTEIN: ABNORMAL
SL AMB POCT UROBILINOGEN: 0.2

## 2023-12-26 PROCEDURE — 81003 URINALYSIS AUTO W/O SCOPE: CPT | Performed by: UROLOGY

## 2023-12-26 PROCEDURE — 99203 OFFICE O/P NEW LOW 30 MIN: CPT | Performed by: UROLOGY

## 2023-12-26 RX ORDER — NITROFURANTOIN 25; 75 MG/1; MG/1
100 CAPSULE ORAL 2 TIMES DAILY
Qty: 14 CAPSULE | Refills: 3 | Status: SHIPPED | OUTPATIENT
Start: 2023-12-26

## 2023-12-26 NOTE — PROGRESS NOTES
UROLOGY PROGRESS NOTE         NAME: Jamey Gaston  AGE: 68 y.o. SEX: female  : 1955   MRN: [id number]    DATE: 2023  TIME: 10:27 AM    Assessment and Plan      Impression:   1. Urinary tract infection without hematuria, site unspecified  -     Ambulatory referral to Urology  -     POCT urine dip auto non-scope    2. Microscopic hematuria      Patient with a roughly 3 UTIs per year.  Currently on chemotherapy and Arimidex mild mixed urinary incontinence.   Plan: We will set her up for a cystoscopy.  We talked about the different treatments for recurrent UTIs and mild interstitial cystitis.  She will self treat with Macrobid.  She will discuss the different treatment options with her oncologist to make sure they do not interact..  I suspect they will be okay with self treating.      Chief Complaint     Chief Complaint   Patient presents with    Urinary Incontinence    Urinary Frequency    Urinary Urgency    Urinary Tract Infection    Nocturia     History of Present Illness     HPI: Jamey Gaston is a 68 y.o. year old female who presents with history of recurrent UTIs.  Currently she is undergoing treatment for metastatic breast cancer.  Currently without symptoms.  Sounds like she has had mild interstitial cystitis over the years.  She gets roughly 2-3 infections per year.  She has gone long periods without infections.  No gross hematuria.  She does have microscopic blood today.  Leukocyte positive nitrite negative.  CAT scan reveals no significant upper tract disease.  She is on Arimidex for her breast cancer.  She had an anterior posterior repair 20 years ago.  She has mild stress incontinence and urge incontinence she voids every 2 hours.  Mild urgency and frequency during the day as well.  She had cystoscopy many years ago by urologist in [place].              The following portions of the patient's history were reviewed and updated as appropriate: allergies, current medications, past family  history, past medical history, past social history, past surgical history and problem list.  Past Medical History:   Diagnosis Date    Allergies     Asthma     Breast CA (HCC)     Cancer (HCC)     Cystocele with prolapse     GERD (gastroesophageal reflux disease)     Hernia of abdominal wall     Hyperlipidemia     Hypertension     Hypothyroidism      Past Surgical History:   Procedure Laterality Date    BLADDER REPAIR      BREAST SURGERY      HYSTERECTOMY      TOOTH EXTRACTION       shoulder  Review of Systems     Const: Denies chills, fever and weight loss.  CV: Denies chest pain.  Resp: Denies SOB.  GI: Denies abdominal pain, nausea and vomiting.  : Denies symptoms other than stated above.  Musculo: Denies back pain.    Objective   /66   Pulse 66   Temp 98 °F (36.7 °C)   Ht 5' (1.524 m)   Wt 72.7 kg (160 lb 3.2 oz)   SpO2 99%   BMI 31.29 kg/m²     Physical Exam  Const: Appears healthy and well developed. No signs of acute distress present.  Resp: Respirations are regular and unlabored.   CV: Rate is regular. Rhythm is regular.  Abdomen: Abdomen is soft, nontender, and nondistended. Kidneys are not palpable.  : Mild anterior hypermobility.  No palpable masses.  Tissue appears healthy.  Psych: Patient's attitude is cooperative. Mood is normal. Affect is normal.    Current Medications     Current Outpatient Medications:     anastrozole (ARIMIDEX) 1 mg tablet, Take 1 mg by mouth daily, Disp: , Rfl:     aspirin (ECOTRIN LOW STRENGTH) 81 mg EC tablet, Take 1 tablet by mouth daily, Disp: , Rfl:     atorvastatin (LIPITOR) 20 mg tablet, Take by mouth, Disp: , Rfl:     B Complex Vitamins (Vitamin B-Complex) TABS, Take 1 tablet by mouth daily, Disp: , Rfl:     Biotin 5 MG CAPS, Take 1 capsule by mouth, Disp: , Rfl:     Calcium Carbonate-Vit D-Min (Calcium 1200) 9038-6184 MG-UNIT CHEW, Chew 1 tablet daily, Disp: , Rfl:     carvedilol (COREG) 12.5 mg tablet, Take 12.5 mg by mouth daily, Disp: , Rfl:      cetirizine (ZyrTEC) 10 mg tablet, Take by mouth, Disp: , Rfl:     levothyroxine 112 mcg tablet, Take by mouth, Disp: , Rfl:     Magnesium Chloride 64 MG TABS, Take 64 mg by mouth, Disp: , Rfl:     montelukast (SINGULAIR) 10 mg tablet, Take by mouth, Disp: , Rfl:     Omega-3 Fatty Acids (fish oil) 1,000 mg, Take 1,000 mg by mouth daily, Disp: , Rfl:     oxyCODONE (ROXICODONE) 5 immediate release tablet, , Disp: , Rfl:     Abemaciclib 150 MG TABS, Take 150 mg by mouth 2 (two) times a day, Disp: , Rfl:     albuterol (PROVENTIL HFA,VENTOLIN HFA) 90 mcg/act inhaler, Inhale (Patient not taking: Reported on 10/7/2022), Disp: , Rfl:     benzonatate (TESSALON PERLES) 100 mg capsule, , Disp: , Rfl:     budesonide-formoterol (SYMBICORT) 160-4.5 mcg/act inhaler, Inhale 2 puffs 2 (two) times a day (Patient not taking: Reported on 10/7/2022), Disp: , Rfl:     cloNIDine (CATAPRES) 0.1 mg tablet, Take by mouth (Patient not taking: Reported on 10/7/2022), Disp: , Rfl:     pantoprazole (PROTONIX) 40 mg tablet, Take 40 mg by mouth daily, Disp: , Rfl:         Frank D'Amico, MD

## 2024-02-01 RX ORDER — LETROZOLE 2.5 MG/1
2.5 TABLET, FILM COATED ORAL DAILY
COMMUNITY
Start: 2024-01-12 | End: 2025-01-11

## 2024-02-05 ENCOUNTER — TELEPHONE (OUTPATIENT)
Age: 69
End: 2024-02-05

## 2024-02-05 RX ORDER — ABEMACICLIB 100 MG/1
TABLET ORAL
COMMUNITY
Start: 2024-01-18

## 2024-02-05 RX ORDER — SULFAMETHOXAZOLE AND TRIMETHOPRIM 800; 160 MG/1; MG/1
TABLET ORAL
COMMUNITY
Start: 2023-12-07 | End: 2024-02-06 | Stop reason: ALTCHOICE

## 2024-02-05 NOTE — TELEPHONE ENCOUNTER
Patient called today to request a call back.    She has questions re: tomorrow's Cystoscopy.    Pt wants to know will she be under and sedation, if it'll be painful? Amongst other concerns.    Call back [phone] or [phone]

## 2024-02-06 ENCOUNTER — PROCEDURE VISIT (OUTPATIENT)
Dept: UROLOGY | Facility: CLINIC | Age: 69
End: 2024-02-06
Payer: MEDICARE

## 2024-02-06 VITALS
WEIGHT: 163.2 LBS | SYSTOLIC BLOOD PRESSURE: 122 MMHG | HEART RATE: 67 BPM | DIASTOLIC BLOOD PRESSURE: 76 MMHG | TEMPERATURE: 97.2 F | OXYGEN SATURATION: 97 % | BODY MASS INDEX: 32.04 KG/M2 | HEIGHT: 60 IN

## 2024-02-06 DIAGNOSIS — R31.29 MICROSCOPIC HEMATURIA: Primary | ICD-10-CM

## 2024-02-06 DIAGNOSIS — N39.0 URINARY TRACT INFECTION WITHOUT HEMATURIA, SITE UNSPECIFIED: ICD-10-CM

## 2024-02-06 LAB
SL AMB  POCT GLUCOSE, UA: ABNORMAL
SL AMB LEUKOCYTE ESTERASE,UA: ABNORMAL
SL AMB POCT BILIRUBIN,UA: ABNORMAL
SL AMB POCT BLOOD,UA: ABNORMAL
SL AMB POCT CLARITY,UA: CLEAR
SL AMB POCT COLOR,UA: YELLOW
SL AMB POCT KETONES,UA: ABNORMAL
SL AMB POCT NITRITE,UA: ABNORMAL
SL AMB POCT PH,UA: 6
SL AMB POCT SPECIFIC GRAVITY,UA: 1.02
SL AMB POCT URINE PROTEIN: ABNORMAL
SL AMB POCT UROBILINOGEN: 0.2

## 2024-02-06 PROCEDURE — 52000 CYSTOURETHROSCOPY: CPT | Performed by: UROLOGY

## 2024-02-06 PROCEDURE — 99213 OFFICE O/P EST LOW 20 MIN: CPT | Performed by: UROLOGY

## 2024-02-06 PROCEDURE — 87086 URINE CULTURE/COLONY COUNT: CPT | Performed by: UROLOGY

## 2024-02-06 PROCEDURE — 81003 URINALYSIS AUTO W/O SCOPE: CPT | Performed by: UROLOGY

## 2024-02-06 RX ORDER — CEFUROXIME AXETIL 500 MG/1
500 TABLET ORAL EVERY 12 HOURS SCHEDULED
Qty: 14 TABLET | Refills: 0 | Status: SHIPPED | OUTPATIENT
Start: 2024-02-06 | End: 2024-02-13

## 2024-02-06 NOTE — PROGRESS NOTES
"   Cystoscopy     Date/Time  2/6/2024 9:30 AM     Performed by  Frank D'Amico, MD   Authorized by  Frank D'Amico, MD     Universal Protocol:  Consent: Verbal consent obtained. Written consent obtained.  Risks and benefits: risks, benefits and alternatives were discussed  Consent given by: patient  Time out: Immediately prior to procedure a \"time out\" was called to verify the correct patient, procedure, equipment, support staff and site/side marked as required.  Timeout called at: 2/6/2024 10:14 AM.  Patient identity confirmed: verbally with patient      Procedure Details:  Procedure type: cystoscopy    Patient tolerance: Patient tolerated the procedure well with no immediate complications    Additional Procedure Details: Patient flexible cystoscopy and she was visualized.  Dorsal incision.  Prepped and draped cystoscopy low normal urethra.  Her bladder had patchy erythema consistent with a possible UTI.  No tumors noted.  Orifices normal.  Urine negative for nitrites.  Leukocyte esterase present.  Will send a culture.  Started on Ceftin.  Repeat cystoscopy in 1 months.  Consider biopsy if the erythema does not resolve.  She will push fluids.  1 dose of Keflex today.      "

## 2024-02-06 NOTE — PROGRESS NOTES
UROLOGY PROGRESS NOTE         NAME: Jamey Gaston  AGE: 68 y.o. SEX: female  : 1955   MRN: [id number]    DATE: 2024  TIME: 10:16 AM    Assessment and Plan      Impression:   1. Urinary tract infection without hematuria, site unspecified  -     POCT urine dip auto non-scope  -     cefuroxime (CEFTIN) 500 mg tablet; Take 1 tablet (500 mg total) by mouth every 12 (twelve) hours for 7 days  -     Urine culture; Future  -     Cystoscopy  -     Urine culture  -     Urine culture; Future    2. Microscopic hematuria    Patient doing well.  She self treated with Macrobid once.  Erythema of the bladder today.  Probable UTI.  Possible chronic cystitis.     Plan: Ceftin twice daily for a week.  Push fluids.  Cystoscopy again in 1 month.  Consider biopsy erythema Not gone.    Chief Complaint     Chief Complaint   Patient presents with    Cystoscopy     History of Present Illness     HPI: Jamey Gaston is a 68 y.o. year old female who presents with history of recurrent UTIs and microscopic hematuria.  She is here for cystoscopy today.              The following portions of the patient's history were reviewed and updated as appropriate: allergies, current medications, past family history, past medical history, past social history, past surgical history and problem list.  Past Medical History:   Diagnosis Date    Allergies     Asthma     Breast CA (HCC)     Cancer (HCC)     Cystocele with prolapse     GERD (gastroesophageal reflux disease)     Hernia of abdominal wall     Hyperlipidemia     Hypertension     Hypothyroidism     Urinary incontinence     Urinary retention     Urinary tract infection      Past Surgical History:   Procedure Laterality Date    BLADDER REPAIR      BREAST SURGERY      HERNIA REPAIR      HYSTERECTOMY      TOOTH EXTRACTION       shoulder  Review of Systems     Const: Denies chills, fever and weight loss.  CV: Denies chest pain.  Resp: Denies SOB.  GI: Denies abdominal pain, nausea and  vomiting.  : Denies symptoms other than stated above.  Musculo: Denies back pain.    Objective   /76 (BP Location: Left arm, Patient Position: Sitting, Cuff Size: Adult)   Pulse 67   Temp (!) 97.2 °F (36.2 °C)   Ht 5' (1.524 m)   Wt 74 kg (163 lb 3.2 oz)   SpO2 97%   BMI 31.87 kg/m²     Physical Exam  Const: Appears healthy and well developed. No signs of acute distress present.  Resp: Respirations are regular and unlabored.   CV: Rate is regular. Rhythm is regular.  Abdomen: Abdomen is soft, nontender, and nondistended. Kidneys are not palpable.  : Mild atrophic changes.  Psych: Patient's attitude is cooperative. Mood is normal. Affect is normal.    Current Medications     Current Outpatient Medications:     aspirin (ECOTRIN LOW STRENGTH) 81 mg EC tablet, Take 1 tablet by mouth daily, Disp: , Rfl:     atorvastatin (LIPITOR) 20 mg tablet, Take by mouth, Disp: , Rfl:     B Complex Vitamins (Vitamin B-Complex) TABS, Take 1 tablet by mouth daily, Disp: , Rfl:     Biotin 5 MG CAPS, Take 1 capsule by mouth, Disp: , Rfl:     Calcium Carbonate-Vit D-Min (Calcium 1200) 2364-8065 MG-UNIT CHEW, Chew 1 tablet daily, Disp: , Rfl:     carvedilol (COREG) 12.5 mg tablet, Take 12.5 mg by mouth daily, Disp: , Rfl:     cefuroxime (CEFTIN) 500 mg tablet, Take 1 tablet (500 mg total) by mouth every 12 (twelve) hours for 7 days, Disp: 14 tablet, Rfl: 0    cetirizine (ZyrTEC) 10 mg tablet, Take by mouth, Disp: , Rfl:     cloNIDine (CATAPRES) 0.1 mg tablet, Take by mouth 2 (two) times a day, Disp: , Rfl:     letrozole (FEMARA) 2.5 mg tablet, Take 2.5 mg by mouth daily, Disp: , Rfl:     levothyroxine 112 mcg tablet, Take by mouth, Disp: , Rfl:     Magnesium Chloride 64 MG TABS, Take 64 mg by mouth, Disp: , Rfl:     montelukast (SINGULAIR) 10 mg tablet, Take by mouth, Disp: , Rfl:     Omega-3 Fatty Acids (fish oil) 1,000 mg, Take 1,000 mg by mouth daily, Disp: , Rfl:     pantoprazole (PROTONIX) 40 mg tablet, Take 40 mg by  mouth daily, Disp: , Rfl:     Verzenio 100 MG TABS, , Disp: , Rfl:     anastrozole (ARIMIDEX) 1 mg tablet, Take 1 mg by mouth daily (Patient not taking: Reported on 2/6/2024), Disp: , Rfl:     budesonide-formoterol (SYMBICORT) 160-4.5 mcg/act inhaler, Inhale 2 puffs 2 (two) times a day (Patient not taking: Reported on 10/7/2022), Disp: , Rfl:         Frank D'Amico, MD

## 2024-02-07 LAB — BACTERIA UR CULT: NORMAL

## 2024-03-05 ENCOUNTER — PROCEDURE VISIT (OUTPATIENT)
Dept: UROLOGY | Facility: CLINIC | Age: 69
End: 2024-03-05
Payer: MEDICARE

## 2024-03-05 VITALS
HEART RATE: 65 BPM | BODY MASS INDEX: 32.35 KG/M2 | OXYGEN SATURATION: 100 % | SYSTOLIC BLOOD PRESSURE: 118 MMHG | DIASTOLIC BLOOD PRESSURE: 60 MMHG | WEIGHT: 164.8 LBS | HEIGHT: 60 IN | TEMPERATURE: 98 F

## 2024-03-05 DIAGNOSIS — N39.0 URINARY TRACT INFECTION WITHOUT HEMATURIA, SITE UNSPECIFIED: ICD-10-CM

## 2024-03-05 DIAGNOSIS — R31.29 MICROSCOPIC HEMATURIA: Primary | ICD-10-CM

## 2024-03-05 PROCEDURE — 99213 OFFICE O/P EST LOW 20 MIN: CPT | Performed by: UROLOGY

## 2024-03-05 PROCEDURE — 52000 CYSTOURETHROSCOPY: CPT | Performed by: UROLOGY

## 2024-03-05 PROCEDURE — 81003 URINALYSIS AUTO W/O SCOPE: CPT | Performed by: UROLOGY

## 2024-03-05 PROCEDURE — 87186 SC STD MICRODIL/AGAR DIL: CPT | Performed by: UROLOGY

## 2024-03-05 PROCEDURE — 87077 CULTURE AEROBIC IDENTIFY: CPT | Performed by: UROLOGY

## 2024-03-05 PROCEDURE — 87086 URINE CULTURE/COLONY COUNT: CPT | Performed by: UROLOGY

## 2024-03-05 PROCEDURE — 88112 CYTOPATH CELL ENHANCE TECH: CPT | Performed by: PATHOLOGY

## 2024-03-05 RX ORDER — NITROFURANTOIN MACROCRYSTALS 50 MG/1
50 CAPSULE ORAL DAILY
Qty: 90 CAPSULE | Refills: 0 | Status: SHIPPED | OUTPATIENT
Start: 2024-03-05

## 2024-03-05 NOTE — PROGRESS NOTES
"   Cystoscopy     Date/Time  3/5/2024 1:00 PM     Performed by  Frank D'Amico, MD   Authorized by  Frank D'Amico, MD     Universal Protocol:  Procedure performed by:  Consent: Verbal consent obtained. Written consent obtained.  Risks and benefits: risks, benefits and alternatives were discussed  Consent given by: patient  Time out: Immediately prior to procedure a \"time out\" was called to verify the correct patient, procedure, equipment, support staff and site/side marked as required.  Timeout called at: 3/5/2024 1:41 PM.  Patient identity confirmed: verbally with patient      Procedure Details:  Procedure type: cystoscopy    Patient tolerance: Patient tolerated the procedure well with no immediate complications    Additional Procedure Details: Flexible digital cystoscopy was carried out.  Lithotomy position.  Patient was able to visualize the findings.  She was prepped.  Cystoscopy reveals continued patchy erythematous in the bladder with cloudy urine.  Most consistent with cystitis.  No deformities otherwise.  No tumors noted.  Orifices normal.  Urethra normal.  1 dose of Macrobid.      "

## 2024-03-05 NOTE — PROGRESS NOTES
UROLOGY PROGRESS NOTE         NAME: Jamey Gaston  AGE: 68 y.o. SEX: female  : 1955   MRN: [id number]    DATE: 3/5/2024  TIME: 1:42 PM    Assessment and Plan      Impression:   1. Microscopic hematuria  -     POCT urine dip auto non-scope  -     nitrofurantoin (MACRODANTIN) 50 mg capsule; Take 1 capsule (50 mg total) by mouth in the morning  -     Cystoscopy    2. Urinary tract infection without hematuria, site unspecified  -     nitrofurantoin (MACRODANTIN) 50 mg capsule; Take 1 capsule (50 mg total) by mouth in the morning    Continued patchy erythema of the bladder.  Her symptoms had significantly improved and then now recurred the last couple days.  Significant urgency and frequency.  Findings most consistent with cystitis related to infection.  Discussed possibility of possible carcinoma in situ.  Options discussed     Plan: Cystoscopy again in a month.  We are going to send a culture today and restart her on Macrodantin.  Will use suppression 50 mg daily.  1 dose of Macrobid today.  Consider biopsy if it does not resolve.      Chief Complaint     Chief Complaint   Patient presents with    Cystoscopy    Hematuria    Urinary Frequency     History of Present Illness     HPI: Jamey Gaston is a 68 y.o. year old female who presents with history of recurrent UTIs microscopic hematuria.  She did have history of breast cancer status post bilateral mastectomies.  She was positive for estrogen receptors.  Although her oncologist is okay topical estrogen to the vaginal vault.  I do believe would be contraindicated in her situation after discussion with her.  We therefore we will hold on that.    Cystoscopy see separate note.  Again patchy erythema.  Although her symptoms have recurred again they did improve on antibiotics significantly.              The following portions of the patient's history were reviewed and updated as appropriate: allergies, current medications, past family history, past medical  history, past social history, past surgical history and problem list.  Past Medical History:   Diagnosis Date    Allergies     Asthma     Breast CA (HCC)     Cancer (HCC)     Cystocele with prolapse     GERD (gastroesophageal reflux disease)     Hernia of abdominal wall     Hyperlipidemia     Hypertension     Hypothyroidism     Microscopic hematuria     Urinary incontinence     Urinary retention     Urinary tract infection     UTI (urinary tract infection)      Past Surgical History:   Procedure Laterality Date    BLADDER REPAIR      BREAST SURGERY      HERNIA REPAIR      HYSTERECTOMY      TOOTH EXTRACTION       shoulder  Review of Systems     Const: Denies chills, fever and weight loss.  CV: Denies chest pain.  Resp: Denies SOB.  GI: Denies abdominal pain, nausea and vomiting.  : Denies symptoms other than stated above.  Musculo: Denies back pain.    Objective   /60   Pulse 65   Temp 98 °F (36.7 °C)   Ht 5' (1.524 m)   Wt 74.8 kg (164 lb 12.8 oz)   SpO2 100%   BMI 32.19 kg/m²     Physical Exam  Const: Appears healthy and well developed. No signs of acute distress present.  Resp: Respirations are regular and unlabored.   CV: Rate is regular. Rhythm is regular.  Abdomen: Abdomen is soft, nontender, and nondistended. Kidneys are not palpable.  : Atrophic changes on exam.  No palpable mass.  Psych: Patient's attitude is cooperative. Mood is normal. Affect is normal.    Current Medications     Current Outpatient Medications:     aspirin (ECOTRIN LOW STRENGTH) 81 mg EC tablet, Take 1 tablet by mouth daily, Disp: , Rfl:     atorvastatin (LIPITOR) 20 mg tablet, Take by mouth, Disp: , Rfl:     B Complex Vitamins (Vitamin B-Complex) TABS, Take 1 tablet by mouth daily, Disp: , Rfl:     Biotin 5 MG CAPS, Take 1 capsule by mouth, Disp: , Rfl:     Calcium Carbonate-Vit D-Min (Calcium 1200) 7715-2735 MG-UNIT CHEW, Chew 1 tablet daily, Disp: , Rfl:     carvedilol (COREG) 12.5 mg tablet, Take 12.5 mg by mouth daily,  Disp: , Rfl:     cetirizine (ZyrTEC) 10 mg tablet, Take by mouth, Disp: , Rfl:     cloNIDine (CATAPRES) 0.1 mg tablet, Take by mouth 2 (two) times a day, Disp: , Rfl:     letrozole (FEMARA) 2.5 mg tablet, Take 2.5 mg by mouth daily, Disp: , Rfl:     levothyroxine 112 mcg tablet, Take by mouth, Disp: , Rfl:     Magnesium Chloride 64 MG TABS, Take 64 mg by mouth, Disp: , Rfl:     montelukast (SINGULAIR) 10 mg tablet, Take by mouth, Disp: , Rfl:     nitrofurantoin (MACRODANTIN) 50 mg capsule, Take 1 capsule (50 mg total) by mouth in the morning, Disp: 90 capsule, Rfl: 0    Omega-3 Fatty Acids (fish oil) 1,000 mg, Take 1,000 mg by mouth daily, Disp: , Rfl:     Verzenio 100 MG TABS, , Disp: , Rfl:     anastrozole (ARIMIDEX) 1 mg tablet, Take 1 mg by mouth daily (Patient not taking: Reported on 2/6/2024), Disp: , Rfl:     budesonide-formoterol (SYMBICORT) 160-4.5 mcg/act inhaler, Inhale 2 puffs 2 (two) times a day (Patient not taking: Reported on 10/7/2022), Disp: , Rfl:     pantoprazole (PROTONIX) 40 mg tablet, Take 40 mg by mouth daily, Disp: , Rfl:         Frank D'Amico, MD

## 2024-03-07 LAB — BACTERIA UR CULT: ABNORMAL

## 2024-03-07 PROCEDURE — 88112 CYTOPATH CELL ENHANCE TECH: CPT | Performed by: PATHOLOGY

## 2024-03-14 ENCOUNTER — APPOINTMENT (OUTPATIENT)
Dept: LAB | Facility: HOSPITAL | Age: 69
End: 2024-03-14
Payer: MEDICARE

## 2024-03-14 ENCOUNTER — TELEPHONE (OUTPATIENT)
Dept: UROLOGY | Facility: CLINIC | Age: 69
End: 2024-03-14

## 2024-03-14 DIAGNOSIS — N39.0 URINARY TRACT INFECTION WITHOUT HEMATURIA, SITE UNSPECIFIED: ICD-10-CM

## 2024-03-14 DIAGNOSIS — N39.0 URINARY TRACT INFECTION WITHOUT HEMATURIA, SITE UNSPECIFIED: Primary | ICD-10-CM

## 2024-03-14 DIAGNOSIS — Z17.0 CARCINOMA OF UPPER-OUTER QUADRANT OF LEFT BREAST IN FEMALE, ESTROGEN RECEPTOR POSITIVE (HCC): ICD-10-CM

## 2024-03-14 DIAGNOSIS — C50.412 CARCINOMA OF UPPER-OUTER QUADRANT OF LEFT BREAST IN FEMALE, ESTROGEN RECEPTOR POSITIVE (HCC): ICD-10-CM

## 2024-03-14 LAB
ALBUMIN SERPL BCP-MCNC: 4.1 G/DL (ref 3.5–5)
ALP SERPL-CCNC: 62 U/L (ref 34–104)
ALT SERPL W P-5'-P-CCNC: 23 U/L (ref 7–52)
ANION GAP SERPL CALCULATED.3IONS-SCNC: 8 MMOL/L (ref 4–13)
AST SERPL W P-5'-P-CCNC: 28 U/L (ref 13–39)
BACTERIA UR QL AUTO: ABNORMAL /HPF
BASOPHILS # BLD AUTO: 0.06 THOUSANDS/ÂΜL (ref 0–0.1)
BASOPHILS NFR BLD AUTO: 1 % (ref 0–1)
BILIRUB SERPL-MCNC: 0.71 MG/DL (ref 0.2–1)
BILIRUB UR QL STRIP: NEGATIVE
BUN SERPL-MCNC: 24 MG/DL (ref 5–25)
CALCIUM SERPL-MCNC: 9.3 MG/DL (ref 8.4–10.2)
CHLORIDE SERPL-SCNC: 105 MMOL/L (ref 96–108)
CLARITY UR: ABNORMAL
CO2 SERPL-SCNC: 26 MMOL/L (ref 21–32)
COLOR UR: YELLOW
CREAT SERPL-MCNC: 1.14 MG/DL (ref 0.6–1.3)
EOSINOPHIL # BLD AUTO: 0.48 THOUSAND/ÂΜL (ref 0–0.61)
EOSINOPHIL NFR BLD AUTO: 9 % (ref 0–6)
ERYTHROCYTE [DISTWIDTH] IN BLOOD BY AUTOMATED COUNT: 14.1 % (ref 11.6–15.1)
GFR SERPL CREATININE-BSD FRML MDRD: 49 ML/MIN/1.73SQ M
GLUCOSE P FAST SERPL-MCNC: 87 MG/DL (ref 65–99)
GLUCOSE UR STRIP-MCNC: NEGATIVE MG/DL
HCT VFR BLD AUTO: 33.3 % (ref 34.8–46.1)
HGB BLD-MCNC: 11 G/DL (ref 11.5–15.4)
HGB UR QL STRIP.AUTO: ABNORMAL
IMM GRANULOCYTES # BLD AUTO: 0.01 THOUSAND/UL (ref 0–0.2)
IMM GRANULOCYTES NFR BLD AUTO: 0 % (ref 0–2)
KETONES UR STRIP-MCNC: NEGATIVE MG/DL
LEUKOCYTE ESTERASE UR QL STRIP: ABNORMAL
LYMPHOCYTES # BLD AUTO: 0.91 THOUSANDS/ÂΜL (ref 0.6–4.47)
LYMPHOCYTES NFR BLD AUTO: 18 % (ref 14–44)
MCH RBC QN AUTO: 29.7 PG (ref 26.8–34.3)
MCHC RBC AUTO-ENTMCNC: 33 G/DL (ref 31.4–37.4)
MCV RBC AUTO: 90 FL (ref 82–98)
MONOCYTES # BLD AUTO: 0.48 THOUSAND/ÂΜL (ref 0.17–1.22)
MONOCYTES NFR BLD AUTO: 9 % (ref 4–12)
NEUTROPHILS # BLD AUTO: 3.24 THOUSANDS/ÂΜL (ref 1.85–7.62)
NEUTS SEG NFR BLD AUTO: 63 % (ref 43–75)
NITRITE UR QL STRIP: NEGATIVE
NON-SQ EPI CELLS URNS QL MICRO: ABNORMAL /HPF
NRBC BLD AUTO-RTO: 0 /100 WBCS
PH UR STRIP.AUTO: 6.5 [PH]
PLATELET # BLD AUTO: 141 THOUSANDS/UL (ref 149–390)
PMV BLD AUTO: 11 FL (ref 8.9–12.7)
POTASSIUM SERPL-SCNC: 4.6 MMOL/L (ref 3.5–5.3)
PROT SERPL-MCNC: 7.1 G/DL (ref 6.4–8.4)
PROT UR STRIP-MCNC: NEGATIVE MG/DL
RBC # BLD AUTO: 3.7 MILLION/UL (ref 3.81–5.12)
RBC #/AREA URNS AUTO: ABNORMAL /HPF
SODIUM SERPL-SCNC: 139 MMOL/L (ref 135–147)
SP GR UR STRIP.AUTO: <=1.005 (ref 1–1.03)
UROBILINOGEN UR QL STRIP.AUTO: 0.2 E.U./DL
WBC # BLD AUTO: 5.18 THOUSAND/UL (ref 4.31–10.16)
WBC #/AREA URNS AUTO: ABNORMAL /HPF
WBC CLUMPS # UR AUTO: PRESENT /UL

## 2024-03-14 PROCEDURE — 80053 COMPREHEN METABOLIC PANEL: CPT

## 2024-03-14 PROCEDURE — 85025 COMPLETE CBC W/AUTO DIFF WBC: CPT

## 2024-03-14 PROCEDURE — 87077 CULTURE AEROBIC IDENTIFY: CPT

## 2024-03-14 PROCEDURE — 87086 URINE CULTURE/COLONY COUNT: CPT

## 2024-03-14 PROCEDURE — 81001 URINALYSIS AUTO W/SCOPE: CPT

## 2024-03-14 PROCEDURE — 36415 COLL VENOUS BLD VENIPUNCTURE: CPT

## 2024-03-14 PROCEDURE — 87186 SC STD MICRODIL/AGAR DIL: CPT

## 2024-03-14 NOTE — TELEPHONE ENCOUNTER
Spoke with patient and made aware that Dr. D'Amico would like another culture obtained. Patient verbalized understanding and will go to the [facility] to give a urine today.

## 2024-03-14 NOTE — TELEPHONE ENCOUNTER
----- Message from Frank D'Amico, MD sent at 3/13/2024  3:13 PM EDT -----  Please have her drop another urine culture off to make sure the UTI has been treated.  Thank you  ----- Message -----  From: Christine Rdz LPN  Sent: 3/5/2024   1:14 PM EDT  To: Frank D'Amico, MD

## 2024-03-15 ENCOUNTER — TELEPHONE (OUTPATIENT)
Dept: UROLOGY | Facility: CLINIC | Age: 69
End: 2024-03-15

## 2024-03-15 ENCOUNTER — PROCEDURE VISIT (OUTPATIENT)
Dept: UROLOGY | Facility: CLINIC | Age: 69
End: 2024-03-15
Payer: MEDICARE

## 2024-03-15 DIAGNOSIS — N39.0 URINARY TRACT INFECTION WITHOUT HEMATURIA, SITE UNSPECIFIED: Primary | ICD-10-CM

## 2024-03-15 DIAGNOSIS — R31.29 MICROSCOPIC HEMATURIA: ICD-10-CM

## 2024-03-15 DIAGNOSIS — R31.29 MICROSCOPIC HEMATURIA: Primary | ICD-10-CM

## 2024-03-15 LAB
BACTERIA UR QL AUTO: ABNORMAL /HPF
BILIRUB UR QL STRIP: NEGATIVE
CLARITY UR: CLEAR
COLOR UR: ABNORMAL
GLUCOSE UR STRIP-MCNC: NEGATIVE MG/DL
HGB UR QL STRIP.AUTO: NEGATIVE
KETONES UR STRIP-MCNC: NEGATIVE MG/DL
LEUKOCYTE ESTERASE UR QL STRIP: ABNORMAL
NITRITE UR QL STRIP: NEGATIVE
NON-SQ EPI CELLS URNS QL MICRO: ABNORMAL /HPF
PH UR STRIP.AUTO: 6.5 [PH]
PROT UR STRIP-MCNC: NEGATIVE MG/DL
RBC #/AREA URNS AUTO: ABNORMAL /HPF
SP GR UR STRIP.AUTO: 1.01 (ref 1–1.03)
UROBILINOGEN UR STRIP-ACNC: <2 MG/DL
WBC #/AREA URNS AUTO: ABNORMAL /HPF
WBC CLUMPS # UR AUTO: PRESENT /UL

## 2024-03-15 PROCEDURE — P9612 CATHETERIZE FOR URINE SPEC: HCPCS

## 2024-03-15 PROCEDURE — 87086 URINE CULTURE/COLONY COUNT: CPT | Performed by: UROLOGY

## 2024-03-15 PROCEDURE — 87077 CULTURE AEROBIC IDENTIFY: CPT | Performed by: UROLOGY

## 2024-03-15 PROCEDURE — 99211 OFF/OP EST MAY X REQ PHY/QHP: CPT

## 2024-03-15 PROCEDURE — 81001 URINALYSIS AUTO W/SCOPE: CPT | Performed by: UROLOGY

## 2024-03-15 PROCEDURE — 87186 SC STD MICRODIL/AGAR DIL: CPT | Performed by: UROLOGY

## 2024-03-15 NOTE — TELEPHONE ENCOUNTER
Pt was instructed to come to office for st cath for urine specimen. Urine obtained via st cath. Yellow and cloudy. Will await results and update pt when received.

## 2024-03-15 NOTE — TELEPHONE ENCOUNTER
----- Message from Frank D'Amico, MD sent at 3/15/2024  2:05 PM EDT -----  Discussed with patient.  She will come in for a cath urine culture today.  ----- Message -----  From: Lab, Background User  Sent: 3/14/2024   1:02 PM EDT  To: Frank D'Amico, MD

## 2024-03-16 LAB
BACTERIA UR CULT: ABNORMAL
BACTERIA UR CULT: ABNORMAL

## 2024-03-17 LAB — BACTERIA UR CULT: ABNORMAL

## 2024-03-18 ENCOUNTER — TELEPHONE (OUTPATIENT)
Dept: UROLOGY | Facility: CLINIC | Age: 69
End: 2024-03-18

## 2024-03-18 RX ORDER — CIPROFLOXACIN 250 MG/1
250 TABLET, FILM COATED ORAL EVERY 12 HOURS SCHEDULED
Qty: 20 TABLET | Refills: 0 | Status: SHIPPED | OUTPATIENT
Start: 2024-03-18 | End: 2024-03-28

## 2024-03-18 NOTE — TELEPHONE ENCOUNTER
----- Message from Frank D'Amico, MD sent at 3/18/2024  2:18 PM EDT -----  Please let her know she has a Pseudomonas UTI.  Take the Cipro twice daily for 10 days.  Then restart the Macrodantin suppression.  She can hold it while she is on the Cipro.  Thank you  ----- Message -----  From: Lab, Background User  Sent: 3/15/2024  10:50 PM EDT  To: Frank D'Amico, MD

## 2024-03-25 ENCOUNTER — TELEPHONE (OUTPATIENT)
Dept: UROLOGY | Facility: AMBULATORY SURGERY CENTER | Age: 69
End: 2024-03-25

## 2024-03-25 NOTE — TELEPHONE ENCOUNTER
Tapan King,    No charge were found for the visit on 3/15.  From the note I found that a UA (72874) was performed not sure if the cath was placed (66220) or if it was removed and just a nurse visit (57455).  If the note could please be completed.      Thanks.

## 2024-03-28 NOTE — PROGRESS NOTES
3/15/2024  Jamey Gaston is a 68 y.o. female  [id number]    Diagnosis:  Chief Complaint    st cath         Patient presents for st cath managed by Dr. D'Amico    Plan:  Send urine for culture as per doctor order      Medication administration:          There were no vitals filed for this visit.      Christine Rdz LPN  Universal Protocol:  Consent: Verbal consent obtained.  Consent given by: patient  Patient understanding: patient states understanding of the procedure being performed  Patient identity confirmed: verbally with patient    Bladder catheterization    Date/Time: 3/15/2024 11:30 AM    Performed by: Christine Rdz LPN  Authorized by: Frank D'Amico, MD    Consent:     Consent given by:  Patient  Universal protocol:     Procedure explained and questions answered to patient or proxy's satisfaction: yes      Patient identity confirmed:  Verbally with patient  Pre-procedure details:     Procedure purpose:  Diagnostic  Procedure details:     Bladder irrigation: no      Catheter insertion:  Non-indwelling    Approach: natural orifice      Catheter type:  Non-latex    Catheter size:  12 Fr    Urine characteristics:  Clear and yellow  Post-procedure details:     Patient tolerance of procedure:  Tolerated well, no immediate complications

## 2024-04-05 ENCOUNTER — APPOINTMENT (OUTPATIENT)
Dept: LAB | Facility: HOSPITAL | Age: 69
End: 2024-04-05
Payer: MEDICARE

## 2024-04-05 DIAGNOSIS — Z01.818 PRE-OP TESTING: ICD-10-CM

## 2024-04-05 LAB
BASOPHILS # BLD AUTO: 0.04 THOUSANDS/ÂΜL (ref 0–0.1)
BASOPHILS NFR BLD AUTO: 1 % (ref 0–1)
EOSINOPHIL # BLD AUTO: 0.59 THOUSAND/ÂΜL (ref 0–0.61)
EOSINOPHIL NFR BLD AUTO: 11 % (ref 0–6)
ERYTHROCYTE [DISTWIDTH] IN BLOOD BY AUTOMATED COUNT: 13.8 % (ref 11.6–15.1)
HCT VFR BLD AUTO: 34.4 % (ref 34.8–46.1)
HGB BLD-MCNC: 11.2 G/DL (ref 11.5–15.4)
IMM GRANULOCYTES # BLD AUTO: 0.01 THOUSAND/UL (ref 0–0.2)
IMM GRANULOCYTES NFR BLD AUTO: 0 % (ref 0–2)
LYMPHOCYTES # BLD AUTO: 0.94 THOUSANDS/ÂΜL (ref 0.6–4.47)
LYMPHOCYTES NFR BLD AUTO: 17 % (ref 14–44)
MCH RBC QN AUTO: 28.9 PG (ref 26.8–34.3)
MCHC RBC AUTO-ENTMCNC: 32.6 G/DL (ref 31.4–37.4)
MCV RBC AUTO: 89 FL (ref 82–98)
MONOCYTES # BLD AUTO: 0.4 THOUSAND/ÂΜL (ref 0.17–1.22)
MONOCYTES NFR BLD AUTO: 7 % (ref 4–12)
NEUTROPHILS # BLD AUTO: 3.43 THOUSANDS/ÂΜL (ref 1.85–7.62)
NEUTS SEG NFR BLD AUTO: 64 % (ref 43–75)
NRBC BLD AUTO-RTO: 0 /100 WBCS
PLATELET # BLD AUTO: 126 THOUSANDS/UL (ref 149–390)
PMV BLD AUTO: 11.5 FL (ref 8.9–12.7)
RBC # BLD AUTO: 3.87 MILLION/UL (ref 3.81–5.12)
WBC # BLD AUTO: 5.41 THOUSAND/UL (ref 4.31–10.16)

## 2024-04-05 PROCEDURE — 85025 COMPLETE CBC W/AUTO DIFF WBC: CPT

## 2024-04-05 PROCEDURE — 36415 COLL VENOUS BLD VENIPUNCTURE: CPT

## 2024-04-12 ENCOUNTER — APPOINTMENT (OUTPATIENT)
Dept: LAB | Facility: HOSPITAL | Age: 69
End: 2024-04-12
Payer: MEDICARE

## 2024-04-12 DIAGNOSIS — Z01.818 PRE-OP TESTING: ICD-10-CM

## 2024-04-12 LAB
BASOPHILS # BLD AUTO: 0.04 THOUSANDS/ÂΜL (ref 0–0.1)
BASOPHILS NFR BLD AUTO: 1 % (ref 0–1)
EOSINOPHIL # BLD AUTO: 0.42 THOUSAND/ÂΜL (ref 0–0.61)
EOSINOPHIL NFR BLD AUTO: 9 % (ref 0–6)
ERYTHROCYTE [DISTWIDTH] IN BLOOD BY AUTOMATED COUNT: 13.7 % (ref 11.6–15.1)
HCT VFR BLD AUTO: 34 % (ref 34.8–46.1)
HGB BLD-MCNC: 11.3 G/DL (ref 11.5–15.4)
IMM GRANULOCYTES # BLD AUTO: 0.01 THOUSAND/UL (ref 0–0.2)
IMM GRANULOCYTES NFR BLD AUTO: 0 % (ref 0–2)
LYMPHOCYTES # BLD AUTO: 0.83 THOUSANDS/ÂΜL (ref 0.6–4.47)
LYMPHOCYTES NFR BLD AUTO: 18 % (ref 14–44)
MCH RBC QN AUTO: 29.1 PG (ref 26.8–34.3)
MCHC RBC AUTO-ENTMCNC: 33.2 G/DL (ref 31.4–37.4)
MCV RBC AUTO: 88 FL (ref 82–98)
MONOCYTES # BLD AUTO: 0.41 THOUSAND/ÂΜL (ref 0.17–1.22)
MONOCYTES NFR BLD AUTO: 9 % (ref 4–12)
NEUTROPHILS # BLD AUTO: 2.99 THOUSANDS/ÂΜL (ref 1.85–7.62)
NEUTS SEG NFR BLD AUTO: 63 % (ref 43–75)
NRBC BLD AUTO-RTO: 0 /100 WBCS
PLATELET # BLD AUTO: 113 THOUSANDS/UL (ref 149–390)
PMV BLD AUTO: 11.5 FL (ref 8.9–12.7)
RBC # BLD AUTO: 3.88 MILLION/UL (ref 3.81–5.12)
WBC # BLD AUTO: 4.7 THOUSAND/UL (ref 4.31–10.16)

## 2024-04-12 PROCEDURE — 36415 COLL VENOUS BLD VENIPUNCTURE: CPT

## 2024-04-12 PROCEDURE — 85025 COMPLETE CBC W/AUTO DIFF WBC: CPT

## 2024-04-15 ENCOUNTER — PROCEDURE VISIT (OUTPATIENT)
Dept: UROLOGY | Facility: CLINIC | Age: 69
End: 2024-04-15
Payer: MEDICARE

## 2024-04-15 VITALS
HEART RATE: 65 BPM | SYSTOLIC BLOOD PRESSURE: 116 MMHG | BODY MASS INDEX: 32.2 KG/M2 | DIASTOLIC BLOOD PRESSURE: 78 MMHG | TEMPERATURE: 98.5 F | OXYGEN SATURATION: 98 % | HEIGHT: 60 IN | WEIGHT: 164 LBS

## 2024-04-15 DIAGNOSIS — R31.29 MICROSCOPIC HEMATURIA: ICD-10-CM

## 2024-04-15 DIAGNOSIS — N39.0 URINARY TRACT INFECTION WITHOUT HEMATURIA, SITE UNSPECIFIED: Primary | ICD-10-CM

## 2024-04-15 PROCEDURE — 52000 CYSTOURETHROSCOPY: CPT | Performed by: UROLOGY

## 2024-04-15 RX ORDER — LEVOTHYROXINE SODIUM 0.1 MG/1
TABLET ORAL
COMMUNITY
Start: 2024-04-15

## 2024-04-15 NOTE — PROGRESS NOTES
"   Cystoscopy     Date/Time  4/15/2024 1:30 PM     Performed by  Frank D'Amico, MD   Authorized by  Frank D'Amico, MD     Universal Protocol:  Consent: Verbal consent obtained. Written consent obtained.  Risks and benefits: risks, benefits and alternatives were discussed  Consent given by: patient  Time out: Immediately prior to procedure a \"time out\" was called to verify the correct patient, procedure, equipment, support staff and site/side marked as required.  Timeout called at: 4/15/2024 2:00 PM.  Patient identity confirmed: verbally with patient      Procedure Details:  Procedure type: cystoscopy    Patient tolerance: Patient tolerated the procedure well with no immediate complications    Additional Procedure Details: Patient underwent flexible digital cystoscopy.  Dorsolithotomy position.  Pelvic exam mild atrophic changes.  Cystoscopy revealed a normal urethra.  Patient was able to visualize the findings.  The erythema in her bladder had resolved.  There were no tumors in the bladder.  No erythema in the bladder.  Orifices were normal.  No diverticuli in the bladder.  No stones in the bladder.  The urethra was normal as well.  The scope was retroflexed.  1 dose of Macrodantin.      "

## 2024-04-15 NOTE — PROGRESS NOTES
UROLOGY PROGRESS NOTE         NAME: Jamey Gaston  AGE: 68 y.o. SEX: female  : 1955   MRN: [id number]    DATE: 4/15/2024  TIME: 2:02 PM    Assessment and Plan      Impression:   1. Urinary tract infection without hematuria, site unspecified  -     Cystoscopy    2. Microscopic hematuria      Patient is on Macrodantin 50 mg daily.  Her symptoms have dramatically improved.  Her cystoscopy was normal today.  She will continue the Macrodantin for a total of 90 days and then discontinue it.  Options discussed.   Plan: If the patient develops recurrent urinary symptoms.  It would be nice to start her on Hip-Sanya twice daily with vitamin C twice daily if this is permitted with her oncologist.  Hopefully she will not have recurrent symptoms.  She will speak up if she does.  Workup negative for any malignancy.      Chief Complaint     Chief Complaint   Patient presents with    Cystoscopy     History of Present Illness     HPI: Jamey Gaston is a 68 y.o. year old female who presents with history microhematuria.  She presents for repeat cystoscopy patient with low-grade Pseudomonas infection.  Her nocturia has improved from 4 times per night to 2 times per night.  Her urgency and frequency has improved during the day.  Her leakage has improved.  She is currently on the Macrodantin 50 mg daily.  She has a 90-day prescription.  Previous erythema in the bladder.    Cystoscopy see separate note.  Erythema resolved.              The following portions of the patient's history were reviewed and updated as appropriate: allergies, current medications, past family history, past medical history, past social history, past surgical history and problem list.  Past Medical History:   Diagnosis Date    Allergies     Asthma     Breast CA (HCC)     Cancer (HCC)     Cystocele with prolapse     GERD (gastroesophageal reflux disease)     Hernia of abdominal wall     Hyperlipidemia     Hypertension     Hypothyroidism     Microscopic  hematuria     Urinary incontinence     Urinary retention     Urinary tract infection     UTI (urinary tract infection)      Past Surgical History:   Procedure Laterality Date    BLADDER REPAIR      BREAST SURGERY      HERNIA REPAIR      HYSTERECTOMY      TOOTH EXTRACTION       shoulder  Review of Systems     Const: Denies chills, fever and weight loss.  CV: Denies chest pain.  Resp: Denies SOB.  GI: Denies abdominal pain, nausea and vomiting.  : Denies symptoms other than stated above.  Musculo: Denies back pain.    Objective   /78 (BP Location: Right arm, Patient Position: Sitting, Cuff Size: Adult)   Pulse 65   Temp 98.5 °F (36.9 °C)   Ht 5' (1.524 m)   Wt 74.4 kg (164 lb)   SpO2 98%   BMI 32.03 kg/m²     Physical Exam  Const: Appears healthy and well developed. No signs of acute distress present.  Resp: Respirations are regular and unlabored.   CV: Rate is regular. Rhythm is regular.  Abdomen: Abdomen is soft, nontender, and nondistended. Kidneys are not palpable.  : Atrophic changes on pelvic exam no palpable mass.  Psych: Patient's attitude is cooperative. Mood is normal. Affect is normal.    Current Medications     Current Outpatient Medications:     aspirin (ECOTRIN LOW STRENGTH) 81 mg EC tablet, Take 1 tablet by mouth daily, Disp: , Rfl:     atorvastatin (LIPITOR) 20 mg tablet, Take by mouth, Disp: , Rfl:     B Complex Vitamins (Vitamin B-Complex) TABS, Take 1 tablet by mouth daily, Disp: , Rfl:     Biotin 5 MG CAPS, Take 1 capsule by mouth, Disp: , Rfl:     Calcium Carbonate-Vit D-Min (Calcium 1200) 8426-1454 MG-UNIT CHEW, Chew 1 tablet daily, Disp: , Rfl:     carvedilol (COREG) 12.5 mg tablet, Take 12.5 mg by mouth daily, Disp: , Rfl:     cetirizine (ZyrTEC) 10 mg tablet, Take by mouth, Disp: , Rfl:     cloNIDine (CATAPRES) 0.1 mg tablet, Take by mouth 2 (two) times a day, Disp: , Rfl:     letrozole (FEMARA) 2.5 mg tablet, Take 2.5 mg by mouth daily, Disp: , Rfl:     levothyroxine 100 mcg  tablet, , Disp: , Rfl:     Magnesium Chloride 64 MG TABS, Take 64 mg by mouth, Disp: , Rfl:     montelukast (SINGULAIR) 10 mg tablet, Take by mouth, Disp: , Rfl:     nitrofurantoin (MACRODANTIN) 50 mg capsule, Take 1 capsule (50 mg total) by mouth in the morning, Disp: 90 capsule, Rfl: 0    Omega-3 Fatty Acids (fish oil) 1,000 mg, Take 1,000 mg by mouth daily, Disp: , Rfl:     pantoprazole (PROTONIX) 40 mg tablet, Take 40 mg by mouth daily, Disp: , Rfl:     Verzenio 100 MG TABS, , Disp: , Rfl:     budesonide-formoterol (SYMBICORT) 160-4.5 mcg/act inhaler, Inhale 2 puffs 2 (two) times a day (Patient not taking: Reported on 10/7/2022), Disp: , Rfl:         Frank D'Amico, MD

## 2024-05-30 ENCOUNTER — APPOINTMENT (OUTPATIENT)
Dept: LAB | Facility: HOSPITAL | Age: 69
End: 2024-05-30
Payer: MEDICARE

## 2024-05-30 DIAGNOSIS — Z17.0 CARCINOMA OF UPPER-OUTER QUADRANT OF LEFT BREAST IN FEMALE, ESTROGEN RECEPTOR POSITIVE (HCC): ICD-10-CM

## 2024-05-30 DIAGNOSIS — C50.412 CARCINOMA OF UPPER-OUTER QUADRANT OF LEFT BREAST IN FEMALE, ESTROGEN RECEPTOR POSITIVE (HCC): ICD-10-CM

## 2024-05-30 LAB
ALBUMIN SERPL BCP-MCNC: 3.8 G/DL (ref 3.5–5)
ALP SERPL-CCNC: 64 U/L (ref 34–104)
ALT SERPL W P-5'-P-CCNC: 16 U/L (ref 7–52)
ANION GAP SERPL CALCULATED.3IONS-SCNC: 8 MMOL/L (ref 4–13)
AST SERPL W P-5'-P-CCNC: 20 U/L (ref 13–39)
BASOPHILS # BLD AUTO: 0.04 THOUSANDS/ÂΜL (ref 0–0.1)
BASOPHILS NFR BLD AUTO: 1 % (ref 0–1)
BILIRUB SERPL-MCNC: 0.63 MG/DL (ref 0.2–1)
BUN SERPL-MCNC: 31 MG/DL (ref 5–25)
CALCIUM SERPL-MCNC: 9.1 MG/DL (ref 8.4–10.2)
CHLORIDE SERPL-SCNC: 103 MMOL/L (ref 96–108)
CO2 SERPL-SCNC: 26 MMOL/L (ref 21–32)
CREAT SERPL-MCNC: 1.33 MG/DL (ref 0.6–1.3)
EOSINOPHIL # BLD AUTO: 0.23 THOUSAND/ÂΜL (ref 0–0.61)
EOSINOPHIL NFR BLD AUTO: 7 % (ref 0–6)
ERYTHROCYTE [DISTWIDTH] IN BLOOD BY AUTOMATED COUNT: 15.7 % (ref 11.6–15.1)
GFR SERPL CREATININE-BSD FRML MDRD: 40 ML/MIN/1.73SQ M
GLUCOSE SERPL-MCNC: 82 MG/DL (ref 65–140)
HCT VFR BLD AUTO: 31.8 % (ref 34.8–46.1)
HGB BLD-MCNC: 10.3 G/DL (ref 11.5–15.4)
IMM GRANULOCYTES # BLD AUTO: 0 THOUSAND/UL (ref 0–0.2)
IMM GRANULOCYTES NFR BLD AUTO: 0 % (ref 0–2)
LYMPHOCYTES # BLD AUTO: 0.77 THOUSANDS/ÂΜL (ref 0.6–4.47)
LYMPHOCYTES NFR BLD AUTO: 25 % (ref 14–44)
MCH RBC QN AUTO: 28.6 PG (ref 26.8–34.3)
MCHC RBC AUTO-ENTMCNC: 32.4 G/DL (ref 31.4–37.4)
MCV RBC AUTO: 88 FL (ref 82–98)
MONOCYTES # BLD AUTO: 0.29 THOUSAND/ÂΜL (ref 0.17–1.22)
MONOCYTES NFR BLD AUTO: 9 % (ref 4–12)
NEUTROPHILS # BLD AUTO: 1.8 THOUSANDS/ÂΜL (ref 1.85–7.62)
NEUTS SEG NFR BLD AUTO: 58 % (ref 43–75)
NRBC BLD AUTO-RTO: 0 /100 WBCS
PLATELET # BLD AUTO: 104 THOUSANDS/UL (ref 149–390)
PMV BLD AUTO: 10.3 FL (ref 8.9–12.7)
POTASSIUM SERPL-SCNC: 4.6 MMOL/L (ref 3.5–5.3)
PROT SERPL-MCNC: 6.7 G/DL (ref 6.4–8.4)
RBC # BLD AUTO: 3.6 MILLION/UL (ref 3.81–5.12)
SODIUM SERPL-SCNC: 137 MMOL/L (ref 135–147)
WBC # BLD AUTO: 3.13 THOUSAND/UL (ref 4.31–10.16)

## 2024-05-30 PROCEDURE — 36415 COLL VENOUS BLD VENIPUNCTURE: CPT

## 2024-05-30 PROCEDURE — 85025 COMPLETE CBC W/AUTO DIFF WBC: CPT

## 2024-05-30 PROCEDURE — 80053 COMPREHEN METABOLIC PANEL: CPT

## 2024-06-13 ENCOUNTER — APPOINTMENT (OUTPATIENT)
Dept: LAB | Facility: HOSPITAL | Age: 69
End: 2024-06-13
Payer: MEDICARE

## 2024-06-13 DIAGNOSIS — Z17.0 CARCINOMA OF UPPER-OUTER QUADRANT OF LEFT BREAST IN FEMALE, ESTROGEN RECEPTOR POSITIVE (HCC): ICD-10-CM

## 2024-06-13 DIAGNOSIS — C50.412 CARCINOMA OF UPPER-OUTER QUADRANT OF LEFT BREAST IN FEMALE, ESTROGEN RECEPTOR POSITIVE (HCC): ICD-10-CM

## 2024-06-13 LAB
ALBUMIN SERPL BCP-MCNC: 3.9 G/DL (ref 3.5–5)
ALP SERPL-CCNC: 60 U/L (ref 34–104)
ALT SERPL W P-5'-P-CCNC: 15 U/L (ref 7–52)
ANION GAP SERPL CALCULATED.3IONS-SCNC: 7 MMOL/L (ref 4–13)
AST SERPL W P-5'-P-CCNC: 19 U/L (ref 13–39)
BASOPHILS # BLD AUTO: 0.06 THOUSANDS/ÂΜL (ref 0–0.1)
BASOPHILS NFR BLD AUTO: 2 % (ref 0–1)
BILIRUB SERPL-MCNC: 0.49 MG/DL (ref 0.2–1)
BUN SERPL-MCNC: 27 MG/DL (ref 5–25)
CALCIUM SERPL-MCNC: 9.2 MG/DL (ref 8.4–10.2)
CHLORIDE SERPL-SCNC: 105 MMOL/L (ref 96–108)
CO2 SERPL-SCNC: 27 MMOL/L (ref 21–32)
CREAT SERPL-MCNC: 1.37 MG/DL (ref 0.6–1.3)
EOSINOPHIL # BLD AUTO: 0.24 THOUSAND/ÂΜL (ref 0–0.61)
EOSINOPHIL NFR BLD AUTO: 8 % (ref 0–6)
ERYTHROCYTE [DISTWIDTH] IN BLOOD BY AUTOMATED COUNT: 16.1 % (ref 11.6–15.1)
GFR SERPL CREATININE-BSD FRML MDRD: 39 ML/MIN/1.73SQ M
GLUCOSE P FAST SERPL-MCNC: 103 MG/DL (ref 65–99)
HCT VFR BLD AUTO: 33.2 % (ref 34.8–46.1)
HGB BLD-MCNC: 10.6 G/DL (ref 11.5–15.4)
IMM GRANULOCYTES # BLD AUTO: 0.01 THOUSAND/UL (ref 0–0.2)
IMM GRANULOCYTES NFR BLD AUTO: 0 % (ref 0–2)
LYMPHOCYTES # BLD AUTO: 0.73 THOUSANDS/ÂΜL (ref 0.6–4.47)
LYMPHOCYTES NFR BLD AUTO: 25 % (ref 14–44)
MCH RBC QN AUTO: 28.6 PG (ref 26.8–34.3)
MCHC RBC AUTO-ENTMCNC: 31.9 G/DL (ref 31.4–37.4)
MCV RBC AUTO: 90 FL (ref 82–98)
MONOCYTES # BLD AUTO: 0.27 THOUSAND/ÂΜL (ref 0.17–1.22)
MONOCYTES NFR BLD AUTO: 9 % (ref 4–12)
NEUTROPHILS # BLD AUTO: 1.57 THOUSANDS/ÂΜL (ref 1.85–7.62)
NEUTS SEG NFR BLD AUTO: 56 % (ref 43–75)
NRBC BLD AUTO-RTO: 0 /100 WBCS
PLATELET # BLD AUTO: 105 THOUSANDS/UL (ref 149–390)
PMV BLD AUTO: 10.8 FL (ref 8.9–12.7)
POTASSIUM SERPL-SCNC: 4.1 MMOL/L (ref 3.5–5.3)
PROT SERPL-MCNC: 6.9 G/DL (ref 6.4–8.4)
RBC # BLD AUTO: 3.71 MILLION/UL (ref 3.81–5.12)
SODIUM SERPL-SCNC: 139 MMOL/L (ref 135–147)
WBC # BLD AUTO: 2.88 THOUSAND/UL (ref 4.31–10.16)

## 2024-06-13 PROCEDURE — 85025 COMPLETE CBC W/AUTO DIFF WBC: CPT

## 2024-06-13 PROCEDURE — 36415 COLL VENOUS BLD VENIPUNCTURE: CPT

## 2024-06-13 PROCEDURE — 80053 COMPREHEN METABOLIC PANEL: CPT

## 2024-07-18 ENCOUNTER — APPOINTMENT (OUTPATIENT)
Dept: LAB | Facility: HOSPITAL | Age: 69
End: 2024-07-18
Payer: MEDICARE

## 2024-07-18 DIAGNOSIS — R48.8 ANOMIA: ICD-10-CM

## 2024-07-18 DIAGNOSIS — Z17.0 CARCINOMA OF UPPER-OUTER QUADRANT OF LEFT BREAST IN FEMALE, ESTROGEN RECEPTOR POSITIVE (HCC): ICD-10-CM

## 2024-07-18 DIAGNOSIS — N39.0 URINARY TRACT INFECTION WITHOUT HEMATURIA, SITE UNSPECIFIED: ICD-10-CM

## 2024-07-18 DIAGNOSIS — C50.412 CARCINOMA OF UPPER-OUTER QUADRANT OF LEFT BREAST IN FEMALE, ESTROGEN RECEPTOR POSITIVE (HCC): ICD-10-CM

## 2024-07-18 LAB
ALBUMIN SERPL BCG-MCNC: 4 G/DL (ref 3.5–5)
ALP SERPL-CCNC: 63 U/L (ref 34–104)
ALT SERPL W P-5'-P-CCNC: 17 U/L (ref 7–52)
ANION GAP SERPL CALCULATED.3IONS-SCNC: 6 MMOL/L (ref 4–13)
AST SERPL W P-5'-P-CCNC: 23 U/L (ref 13–39)
BASOPHILS # BLD AUTO: 0.06 THOUSANDS/ÂΜL (ref 0–0.1)
BASOPHILS NFR BLD AUTO: 1 % (ref 0–1)
BILIRUB SERPL-MCNC: 0.5 MG/DL (ref 0.2–1)
BUN SERPL-MCNC: 34 MG/DL (ref 5–25)
CALCIUM SERPL-MCNC: 9.9 MG/DL (ref 8.4–10.2)
CHLORIDE SERPL-SCNC: 102 MMOL/L (ref 96–108)
CO2 SERPL-SCNC: 28 MMOL/L (ref 21–32)
CREAT SERPL-MCNC: 1.58 MG/DL (ref 0.6–1.3)
EOSINOPHIL # BLD AUTO: 0.35 THOUSAND/ÂΜL (ref 0–0.61)
EOSINOPHIL NFR BLD AUTO: 8 % (ref 0–6)
ERYTHROCYTE [DISTWIDTH] IN BLOOD BY AUTOMATED COUNT: 15.1 % (ref 11.6–15.1)
FOLATE SERPL-MCNC: >22.3 NG/ML
GFR SERPL CREATININE-BSD FRML MDRD: 32 ML/MIN/1.73SQ M
GLUCOSE SERPL-MCNC: 75 MG/DL (ref 65–140)
HCT VFR BLD AUTO: 34 % (ref 34.8–46.1)
HGB BLD-MCNC: 11.3 G/DL (ref 11.5–15.4)
IMM GRANULOCYTES # BLD AUTO: 0.01 THOUSAND/UL (ref 0–0.2)
IMM GRANULOCYTES NFR BLD AUTO: 0 % (ref 0–2)
LYMPHOCYTES # BLD AUTO: 1.04 THOUSANDS/ÂΜL (ref 0.6–4.47)
LYMPHOCYTES NFR BLD AUTO: 25 % (ref 14–44)
MCH RBC QN AUTO: 28.7 PG (ref 26.8–34.3)
MCHC RBC AUTO-ENTMCNC: 33.2 G/DL (ref 31.4–37.4)
MCV RBC AUTO: 86 FL (ref 82–98)
MONOCYTES # BLD AUTO: 0.33 THOUSAND/ÂΜL (ref 0.17–1.22)
MONOCYTES NFR BLD AUTO: 8 % (ref 4–12)
NEUTROPHILS # BLD AUTO: 2.42 THOUSANDS/ÂΜL (ref 1.85–7.62)
NEUTS SEG NFR BLD AUTO: 58 % (ref 43–75)
NRBC BLD AUTO-RTO: 0 /100 WBCS
PLATELET # BLD AUTO: 144 THOUSANDS/UL (ref 149–390)
PMV BLD AUTO: 10.8 FL (ref 8.9–12.7)
POTASSIUM SERPL-SCNC: 4.5 MMOL/L (ref 3.5–5.3)
PROT SERPL-MCNC: 7.1 G/DL (ref 6.4–8.4)
RBC # BLD AUTO: 3.94 MILLION/UL (ref 3.81–5.12)
SODIUM SERPL-SCNC: 136 MMOL/L (ref 135–147)
VIT B12 SERPL-MCNC: 461 PG/ML (ref 180–914)
WBC # BLD AUTO: 4.21 THOUSAND/UL (ref 4.31–10.16)

## 2024-07-18 PROCEDURE — 82746 ASSAY OF FOLIC ACID SERUM: CPT

## 2024-07-18 PROCEDURE — 85025 COMPLETE CBC W/AUTO DIFF WBC: CPT

## 2024-07-18 PROCEDURE — 82607 VITAMIN B-12: CPT

## 2024-07-18 PROCEDURE — 80053 COMPREHEN METABOLIC PANEL: CPT

## 2024-07-18 PROCEDURE — 36415 COLL VENOUS BLD VENIPUNCTURE: CPT

## 2024-07-18 PROCEDURE — 83918 ORGANIC ACIDS TOTAL QUANT: CPT

## 2024-07-23 LAB — METHYLMALONATE SERPL-SCNC: 142 NMOL/L (ref 0–378)

## 2024-08-01 ENCOUNTER — NURSE TRIAGE (OUTPATIENT)
Age: 69
End: 2024-08-01

## 2024-08-01 ENCOUNTER — APPOINTMENT (OUTPATIENT)
Dept: LAB | Facility: HOSPITAL | Age: 69
End: 2024-08-01
Payer: MEDICARE

## 2024-08-01 DIAGNOSIS — N39.0 RECURRENT UTI: ICD-10-CM

## 2024-08-01 DIAGNOSIS — R39.9 UTI SYMPTOMS: ICD-10-CM

## 2024-08-01 DIAGNOSIS — R39.9 UTI SYMPTOMS: Primary | ICD-10-CM

## 2024-08-01 DIAGNOSIS — N39.0 URINARY TRACT INFECTION WITHOUT HEMATURIA, SITE UNSPECIFIED: ICD-10-CM

## 2024-08-01 LAB
BACTERIA UR QL AUTO: ABNORMAL /HPF
BILIRUB UR QL STRIP: NEGATIVE
CLARITY UR: ABNORMAL
COLOR UR: YELLOW
GLUCOSE UR STRIP-MCNC: NEGATIVE MG/DL
HGB UR QL STRIP.AUTO: NEGATIVE
KETONES UR STRIP-MCNC: NEGATIVE MG/DL
LEUKOCYTE ESTERASE UR QL STRIP: ABNORMAL
NITRITE UR QL STRIP: NEGATIVE
NON-SQ EPI CELLS URNS QL MICRO: ABNORMAL /HPF
PH UR STRIP.AUTO: 6.5 [PH]
PROT UR STRIP-MCNC: NEGATIVE MG/DL
RBC #/AREA URNS AUTO: ABNORMAL /HPF
SP GR UR STRIP.AUTO: <=1.005 (ref 1–1.03)
UROBILINOGEN UR QL STRIP.AUTO: 0.2 E.U./DL
WBC #/AREA URNS AUTO: ABNORMAL /HPF

## 2024-08-01 PROCEDURE — 87086 URINE CULTURE/COLONY COUNT: CPT

## 2024-08-01 PROCEDURE — 81001 URINALYSIS AUTO W/SCOPE: CPT

## 2024-08-01 RX ORDER — SULFAMETHOXAZOLE AND TRIMETHOPRIM 800; 160 MG/1; MG/1
1 TABLET ORAL EVERY 12 HOURS SCHEDULED
Qty: 14 TABLET | Refills: 0 | Status: SHIPPED | OUTPATIENT
Start: 2024-08-01 | End: 2024-08-08

## 2024-08-01 RX ORDER — SULFAMETHOXAZOLE/TRIMETHOPRIM 800-160 MG
1 TABLET ORAL EVERY 12 HOURS SCHEDULED
Qty: 14 TABLET | Refills: 0 | OUTPATIENT
Start: 2024-08-01 | End: 2024-08-08

## 2024-08-01 RX ORDER — SULFAMETHOXAZOLE AND TRIMETHOPRIM 800; 160 MG/1; MG/1
1 TABLET ORAL EVERY 12 HOURS SCHEDULED
Qty: 14 TABLET | Refills: 0 | Status: SHIPPED | OUTPATIENT
Start: 2024-08-01 | End: 2024-08-01

## 2024-08-01 NOTE — TELEPHONE ENCOUNTER
Patient calling to get clarification on the medications that were recommended.    Discussed the Hiprex and Vitamin C, she will call her oncologist tomorrow to discuss.    She has provided a urine sample at the lab and will start Bactrim today    Please resend the Bactrim, it did not go through

## 2024-08-01 NOTE — TELEPHONE ENCOUNTER
NOT A DUPLICATE.... PATIENT IS AT THE PHARMACY AND THEY SAID THE PRESCRIPTION WAS NOT RECEIVED    Reason for call:   [x] Refill   [] Prior Auth  [] Other:     Office:   [] PCP/Provider -   [x] Specialty/Provider - Uro    Medication:  sulfamethoxazole-trimethoprim (BACTRIM DS) 800-160 mg per tablet    Dose/Frequency: ronaldo 1 tablet by mouth every 12 (twelve) hours for 7 days     Quantity: 14    Pharmacy: [address]     Does the patient have enough for 3 days?   [] Yes   [x] No - Send as HP to POD

## 2024-08-01 NOTE — TELEPHONE ENCOUNTER
According to Dr. D'Amico's last office notes, after completion of Macrodantin for 90 days he would like to start hip-Sanya twice daily with vitamin C twice daily.  He would like her to check with her oncologist first.  Please remind her to drop off the urine sample so that we may send for analysis and culture.  Please ask her to let us know what her oncologist decides about the Hip-Sanya and Vitamin C.    In the meantime I will send Bactrim to the pharmacy on file since she does have history of pseudomonas and E.coli in her urine.  Please remind her to provide the urine sample first.

## 2024-08-01 NOTE — TELEPHONE ENCOUNTER
"Patient of Dr. D'Amico last seen in the [place] office called in with concerns of a possible UTI. States she has been experiencing urinary frequency mostly at night time. Denies any fevers or hematuria. States she is drinking plenty of water. Advised to increase water intake and avoid bladder irritants. ED precautions reviewed. Placed urine orders to r/o infection. Patient is requesting an antibiotic to be sent in due to the upcoming weekend. She would like it sent to [facility] on file. States she will go for urine testing prior to starting. Please advise.        Reason for Disposition   Urinating more frequently than usual (i.e., frequency)    Answer Assessment - Initial Assessment Questions  1. SYMPTOM: \"What's the main symptom you're concerned about?\" (e.g., frequency, incontinence)      Frequency   2. ONSET: \"When did the  symptoms  start?\"      Two days ago   3. PAIN: \"Is there any pain?\" If Yes, ask: \"How bad is it?\" (Scale: 1-10; mild, moderate, severe)      no  4. CAUSE: \"What do you think is causing the symptoms?\"      unsure  5. OTHER SYMPTOMS: \"Do you have any other symptoms?\" (e.g., fever, flank pain, blood in urine, pain with urination)      no    Protocols used: Urinary Symptoms-ADULT-[place]    "

## 2024-08-01 NOTE — TELEPHONE ENCOUNTER
Spoke with patient and made aware of message. She verbalized understanding of message. She is going to speak with her oncologist regarding the Hip-Sanya and Vit. C. She did just drop off a urine specimen at the lab. Please check order due to it was not e-prescribed to the pharmacy.

## 2024-08-02 LAB — BACTERIA UR CULT: NORMAL

## 2024-08-05 RX ORDER — METHENAMINE HIPPURATE 1000 MG/1
1 TABLET ORAL 2 TIMES DAILY WITH MEALS
Qty: 180 TABLET | Refills: 11 | Status: SHIPPED | OUTPATIENT
Start: 2024-08-05

## 2024-08-05 NOTE — TELEPHONE ENCOUNTER
Patient calling in to notify Dr.D'Amico that she verified with both her Oncologist and pharmacist that she can take the hiprex with vitamin C.  She would also like to know final culture results. She is currently taking Bactrim at this time.   Urine culture  Status: Final result     Urine culture  Order: [id number]   Status: Final result       Visible to patient: Yes (not seen)       Next appt: None       Dx: UTI symptoms    Specimen Information: Urine, Clean Catch   0 Result Notes  Urine Culture >100,000 cfu/ml   Mixed Contaminants X5              Specimen Collected: 08/01/24 14:36 Last Resulted: 08/02/24 14:23         PREFERRED PHARMACY:  [place] [place] - [address] [place] [address] [[id number]]

## 2024-08-05 NOTE — TELEPHONE ENCOUNTER
Please let Jamey know that her culture showed multiple mixed contaminants and was unable to find infection due to the level of contamination.  This can happen when the urine touches the outside of the vaginal area/skin prior to being collected in the cup.    I have ordered the Hiprex.  Please let  her know she can purchase her vitamin C over the counter as a twice daily dose as recommended by Dr. D'Amico at her last visit in April, 2024

## 2024-08-06 NOTE — TELEPHONE ENCOUNTER
Please let patient know she can take 500 mg of vitamin C twice a day.  She can also lead and improving her bladder health by drinking plenty of water throughout the day.  This helps to flush out the bacteria from the urinary tract.  She can also urinate regularly.  She should hold her urine for extended periods of time.  Emptying her bladder regularly can prevent bacterial growth.  She should also stay away from irritants such as caffeine, alcohol, harsh soaps and bubble baths which can irritate urethra.  She can also wear cotton underwear and loosefitting close to keep the area around the urethra dry.  Tight clothing and not cotton underwear trap moisture and promote bacterial growth.  She can also consider using probiotics in food or in supplement form to promote healthy balance of bacteria.  Hope this helps

## 2024-08-06 NOTE — TELEPHONE ENCOUNTER
Spoke with Pt, She is asking what dose of Vit C to take. I looked at the doctor office note and it doesn't say.  She is also asking how can she get back to good bladder health and how can she get rid of these contaminations.

## 2024-08-07 ENCOUNTER — NURSE TRIAGE (OUTPATIENT)
Age: 69
End: 2024-08-07

## 2024-08-07 DIAGNOSIS — R79.89 ELEVATED SERUM CREATININE: Primary | ICD-10-CM

## 2024-08-07 NOTE — TELEPHONE ENCOUNTER
Please let Jamey know I will retry the medication, but I did review her labs and it shows her creatinine is 1.58. Prior to starting this medication we should get an ultrasound of kidneys and bladder to rule out any underlying issues.

## 2024-08-07 NOTE — TELEPHONE ENCOUNTER
Spoke with Pt, She is aware. She will schedule ultra sound. She wanted to let you know her creatin is high due to cancer and taking verzenio.

## 2024-08-08 NOTE — TELEPHONE ENCOUNTER
Spoke with Pharmacy. I gave them the prescription info over the phone. They are filling it for Pt. Spoke with Pt. She is aware.

## 2024-08-08 NOTE — TELEPHONE ENCOUNTER
Hiprex did not go through to [facility], again.      Please someone phone into Pharm. Phone: [phone]

## 2024-08-13 ENCOUNTER — TELEPHONE (OUTPATIENT)
Dept: UROLOGY | Facility: CLINIC | Age: 69
End: 2024-08-13

## 2024-08-13 NOTE — TELEPHONE ENCOUNTER
Spoke with pt, she will continue with the hiprex until next follow up appt.   Her u/s is tomorrow. She will call back tomorrow to see if there is a sooner appt than 9/5/24.  Nothing else needed at this time.

## 2024-08-14 ENCOUNTER — HOSPITAL ENCOUNTER (OUTPATIENT)
Dept: ULTRASOUND IMAGING | Facility: HOSPITAL | Age: 69
Discharge: HOME | End: 2024-08-14
Payer: MEDICARE

## 2024-08-14 ENCOUNTER — APPOINTMENT (OUTPATIENT)
Dept: LAB | Facility: HOSPITAL | Age: 69
End: 2024-08-14
Payer: MEDICARE

## 2024-08-14 DIAGNOSIS — C50.412 CARCINOMA OF UPPER-OUTER QUADRANT OF LEFT BREAST IN FEMALE, ESTROGEN RECEPTOR POSITIVE (HCC): ICD-10-CM

## 2024-08-14 DIAGNOSIS — Z17.0 CARCINOMA OF UPPER-OUTER QUADRANT OF LEFT BREAST IN FEMALE, ESTROGEN RECEPTOR POSITIVE (HCC): ICD-10-CM

## 2024-08-14 DIAGNOSIS — R79.89 ELEVATED SERUM CREATININE: ICD-10-CM

## 2024-08-14 LAB
ALBUMIN SERPL BCG-MCNC: 3.8 G/DL (ref 3.5–5)
ALP SERPL-CCNC: 56 U/L (ref 34–104)
ALT SERPL W P-5'-P-CCNC: 18 U/L (ref 7–52)
ANION GAP SERPL CALCULATED.3IONS-SCNC: 6 MMOL/L (ref 4–13)
AST SERPL W P-5'-P-CCNC: 21 U/L (ref 13–39)
BASOPHILS # BLD AUTO: 0.06 THOUSANDS/ÂΜL (ref 0–0.1)
BASOPHILS NFR BLD AUTO: 2 % (ref 0–1)
BILIRUB SERPL-MCNC: 0.46 MG/DL (ref 0.2–1)
BUN SERPL-MCNC: 33 MG/DL (ref 5–25)
CALCIUM SERPL-MCNC: 9.1 MG/DL (ref 8.4–10.2)
CHLORIDE SERPL-SCNC: 102 MMOL/L (ref 96–108)
CO2 SERPL-SCNC: 25 MMOL/L (ref 21–32)
CREAT SERPL-MCNC: 1.67 MG/DL (ref 0.6–1.3)
EOSINOPHIL # BLD AUTO: 0.22 THOUSAND/ÂΜL (ref 0–0.61)
EOSINOPHIL NFR BLD AUTO: 8 % (ref 0–6)
ERYTHROCYTE [DISTWIDTH] IN BLOOD BY AUTOMATED COUNT: 15.3 % (ref 11.6–15.1)
GFR SERPL CREATININE-BSD FRML MDRD: 30 ML/MIN/1.73SQ M
GLUCOSE SERPL-MCNC: 113 MG/DL (ref 65–140)
HCT VFR BLD AUTO: 32.2 % (ref 34.8–46.1)
HGB BLD-MCNC: 10.4 G/DL (ref 11.5–15.4)
IMM GRANULOCYTES # BLD AUTO: 0.01 THOUSAND/UL (ref 0–0.2)
IMM GRANULOCYTES NFR BLD AUTO: 0 % (ref 0–2)
LYMPHOCYTES # BLD AUTO: 0.81 THOUSANDS/ÂΜL (ref 0.6–4.47)
LYMPHOCYTES NFR BLD AUTO: 28 % (ref 14–44)
MCH RBC QN AUTO: 29.1 PG (ref 26.8–34.3)
MCHC RBC AUTO-ENTMCNC: 32.3 G/DL (ref 31.4–37.4)
MCV RBC AUTO: 90 FL (ref 82–98)
MONOCYTES # BLD AUTO: 0.24 THOUSAND/ÂΜL (ref 0.17–1.22)
MONOCYTES NFR BLD AUTO: 8 % (ref 4–12)
NEUTROPHILS # BLD AUTO: 1.56 THOUSANDS/ÂΜL (ref 1.85–7.62)
NEUTS SEG NFR BLD AUTO: 54 % (ref 43–75)
NRBC BLD AUTO-RTO: 0 /100 WBCS
PLATELET # BLD AUTO: 104 THOUSANDS/UL (ref 149–390)
PMV BLD AUTO: 9.7 FL (ref 8.9–12.7)
POTASSIUM SERPL-SCNC: 4.1 MMOL/L (ref 3.5–5.3)
PROT SERPL-MCNC: 6.6 G/DL (ref 6.4–8.4)
RBC # BLD AUTO: 3.58 MILLION/UL (ref 3.81–5.12)
SODIUM SERPL-SCNC: 133 MMOL/L (ref 135–147)
WBC # BLD AUTO: 2.9 THOUSAND/UL (ref 4.31–10.16)

## 2024-08-14 PROCEDURE — 36415 COLL VENOUS BLD VENIPUNCTURE: CPT

## 2024-08-14 PROCEDURE — 76775 US EXAM ABDO BACK WALL LIM: CPT

## 2024-08-14 PROCEDURE — 80053 COMPREHEN METABOLIC PANEL: CPT

## 2024-08-14 PROCEDURE — 85025 COMPLETE CBC W/AUTO DIFF WBC: CPT

## 2024-08-20 ENCOUNTER — TELEPHONE (OUTPATIENT)
Dept: UROLOGY | Facility: CLINIC | Age: 69
End: 2024-08-20

## 2024-08-20 NOTE — TELEPHONE ENCOUNTER
----- Message from JESÚS Joyce sent at 8/20/2024  1:42 PM EDT -----  Please let patient know we did get the results of her renal ultrasound.  It does not show swelling either to the kidneys.  Additional findings included mildly increased echogenicity.  Radiologist questioning technical findings versus medical renal disease.  Please have her follow-up with her family doctor.

## 2024-08-20 NOTE — TELEPHONE ENCOUNTER
Vm left for pt to call back, when she does please relay the message from Shara Guerra N.P regarding her u/s and furer recommendations.

## 2024-08-20 NOTE — TELEPHONE ENCOUNTER
Message relayed to patient about renal ultrasound results; patient will follow-up with family doctor.  Patient verbalized understanding.

## 2024-09-03 RX ORDER — PERPHENAZINE 4 MG
6 TABLET ORAL DAILY
COMMUNITY

## 2024-09-03 RX ORDER — ACETAMINOPHEN 325 MG/1
650 TABLET ORAL EVERY 4 HOURS PRN
COMMUNITY

## 2024-09-05 ENCOUNTER — OFFICE VISIT (OUTPATIENT)
Dept: UROLOGY | Facility: CLINIC | Age: 69
End: 2024-09-05
Payer: MEDICARE

## 2024-09-05 VITALS
OXYGEN SATURATION: 97 % | HEART RATE: 52 BPM | HEIGHT: 60 IN | BODY MASS INDEX: 34.16 KG/M2 | SYSTOLIC BLOOD PRESSURE: 132 MMHG | TEMPERATURE: 97 F | WEIGHT: 174 LBS | DIASTOLIC BLOOD PRESSURE: 84 MMHG

## 2024-09-05 DIAGNOSIS — R39.9 UTI SYMPTOMS: Primary | ICD-10-CM

## 2024-09-05 LAB
SL AMB  POCT GLUCOSE, UA: ABNORMAL
SL AMB LEUKOCYTE ESTERASE,UA: ABNORMAL
SL AMB POCT BILIRUBIN,UA: ABNORMAL
SL AMB POCT BLOOD,UA: ABNORMAL
SL AMB POCT CLARITY,UA: CLEAR
SL AMB POCT COLOR,UA: YELLOW
SL AMB POCT KETONES,UA: ABNORMAL
SL AMB POCT NITRITE,UA: ABNORMAL
SL AMB POCT PH,UA: 5.5
SL AMB POCT SPECIFIC GRAVITY,UA: 1.01
SL AMB POCT URINE PROTEIN: ABNORMAL
SL AMB POCT UROBILINOGEN: 0.2

## 2024-09-05 PROCEDURE — 87077 CULTURE AEROBIC IDENTIFY: CPT | Performed by: UROLOGY

## 2024-09-05 PROCEDURE — 87086 URINE CULTURE/COLONY COUNT: CPT | Performed by: UROLOGY

## 2024-09-05 PROCEDURE — 81003 URINALYSIS AUTO W/O SCOPE: CPT | Performed by: UROLOGY

## 2024-09-05 PROCEDURE — 99213 OFFICE O/P EST LOW 20 MIN: CPT | Performed by: UROLOGY

## 2024-09-05 PROCEDURE — 87186 SC STD MICRODIL/AGAR DIL: CPT | Performed by: UROLOGY

## 2024-09-05 NOTE — PROGRESS NOTES
UROLOGY PROGRESS NOTE         NAME: Jamey Gaston  AGE: 69 y.o. SEX: female  : 1955   MRN: [id number]    DATE: 2024  TIME: 12:32 PM    Assessment and Plan      Impression:   1. UTI symptoms  -     POCT urine dip auto non-scope  She will do a voiding diary at night to see if she is third spacing.  Urine culture today.  Continue with Hip-Sanya and vitamin C.     Plan: Follow-up 8 weeks.  She will try to get her feet up in the evening consider using some compression stockings if she is diuresing at nighttime.  We can also consider a diuretic.      Chief Complaint     Chief Complaint   Patient presents with    Frequent UTI     History of Present Illness     HPI: Jamey Gaston is a 69 y.o. year old female who presents with significant urine frequency at night.  3-5 times.  She does limit her fluid in the evening.  She has mild lower leg edema.  Still on the vitamin C Hip-Sanya combination.  Not much urinary frequency or urgency during the day.  UA looks okay today.  She occasionally gets some mild your urinary burning if she is not drinking enough water.  No fever chills no nausea or vomiting.              The following portions of the patient's history were reviewed and updated as appropriate: allergies, current medications, past family history, past medical history, past social history, past surgical history and problem list.  Past Medical History:   Diagnosis Date    Allergies     Asthma     Breast CA (HCC)     Cancer (HCC)     Cystocele with prolapse     GERD (gastroesophageal reflux disease)     Hernia of abdominal wall     Hyperlipidemia     Hypertension     Hypothyroidism     Microscopic hematuria     Urinary incontinence     Urinary retention     Urinary tract infection     UTI (urinary tract infection)      Past Surgical History:   Procedure Laterality Date    BLADDER REPAIR      BREAST SURGERY      HERNIA REPAIR      HYSTERECTOMY      TOOTH EXTRACTION       shoulder  Review of Systems     Const:  Denies chills, fever and weight loss.  CV: Denies chest pain.  Resp: Denies SOB.  GI: Denies abdominal pain, nausea and vomiting.  : Denies symptoms other than stated above.  Musculo: Denies back pain.    Objective   /84   Pulse (!) 52   Temp (!) 97 °F (36.1 °C)   Ht 5' (1.524 m)   Wt 78.9 kg (174 lb)   SpO2 97%   BMI 33.98 kg/m²     Physical Exam  Const: Appears healthy and well developed. No signs of acute distress present.  Resp: Respirations are regular and unlabored.   CV: Rate is regular. Rhythm is regular.  Abdomen: Abdomen is soft, nontender, and nondistended. Kidneys are not palpable.  : Mild lower leg edema.  Psych: Patient's attitude is cooperative. Mood is normal. Affect is normal.    Current Medications     Current Outpatient Medications:     acetaminophen (TYLENOL) 325 mg tablet, Take 650 mg by mouth every 4 (four) hours as needed, Disp: , Rfl:     aspirin (ECOTRIN LOW STRENGTH) 81 mg EC tablet, Take 1 tablet by mouth daily, Disp: , Rfl:     atorvastatin (LIPITOR) 20 mg tablet, Take by mouth, Disp: , Rfl:     B Complex Vitamins (Vitamin B-Complex) TABS, Take 1 tablet by mouth daily, Disp: , Rfl:     Biotin 5 MG CAPS, Take 1 capsule by mouth, Disp: , Rfl:     budesonide-formoterol (SYMBICORT) 160-4.5 mcg/act inhaler, Inhale 2 puffs 2 (two) times a day, Disp: , Rfl:     Calcium Carbonate-Vit D-Min (Calcium 1200) 7993-6287 MG-UNIT CHEW, Chew 1 tablet daily, Disp: , Rfl:     carvedilol (COREG) 12.5 mg tablet, Take 12.5 mg by mouth daily, Disp: , Rfl:     cetirizine (ZyrTEC) 10 mg tablet, Take by mouth, Disp: , Rfl:     cloNIDine (CATAPRES) 0.1 mg tablet, Take by mouth 2 (two) times a day, Disp: , Rfl:     Collagen-Vitamin C-Biotin (Collagen 1500/C) 500-50-0.8 MG CAPS, Take 6 tablets by mouth daily, Disp: , Rfl:     letrozole (FEMARA) 2.5 mg tablet, Take 2.5 mg by mouth daily, Disp: , Rfl:     levothyroxine 100 mcg tablet, , Disp: , Rfl:     Magnesium Chloride 64 MG TABS, Take 64 mg by  mouth, Disp: , Rfl:     methenamine hippurate (HIPREX) 1 g tablet, Take 1 tablet (1 g total) by mouth 2 (two) times a day with meals, Disp: 180 tablet, Rfl: 11    montelukast (SINGULAIR) 10 mg tablet, Take by mouth, Disp: , Rfl:     Omega-3 Fatty Acids (fish oil) 1,000 mg, Take 1,000 mg by mouth daily, Disp: , Rfl:     pantoprazole (PROTONIX) 40 mg tablet, Take 40 mg by mouth daily, Disp: , Rfl:     VAGINAL LUBRICANT VA, Insert 1 tablet into the vagina Once daily as needed, Disp: , Rfl:     Verzenio 100 MG TABS, , Disp: , Rfl:         Frank D'Amico, MD

## 2024-09-07 LAB
BACTERIA UR CULT: ABNORMAL
BACTERIA UR CULT: ABNORMAL

## 2024-09-11 ENCOUNTER — TELEPHONE (OUTPATIENT)
Dept: OTHER | Facility: HOSPITAL | Age: 69
End: 2024-09-11

## 2024-09-11 DIAGNOSIS — N39.0 ACUTE UTI: Primary | ICD-10-CM

## 2024-09-11 RX ORDER — CIPROFLOXACIN 500 MG/1
500 TABLET, FILM COATED ORAL EVERY 12 HOURS SCHEDULED
Qty: 14 TABLET | Refills: 0 | Status: SHIPPED | OUTPATIENT
Start: 2024-09-11 | End: 2024-09-18

## 2024-09-11 NOTE — TELEPHONE ENCOUNTER
Patient called in asking if she should stop taking hiprex while on cipro and restart it once she finishes the course. Please advise.

## 2024-09-11 NOTE — TELEPHONE ENCOUNTER
yes temporary hold the hiprex for the 7  day course of acute abx, then resume hiprex the day after last dose

## 2024-09-23 ENCOUNTER — APPOINTMENT (EMERGENCY)
Dept: CT IMAGING | Facility: HOSPITAL | Age: 69
End: 2024-09-23
Payer: MEDICARE

## 2024-09-23 ENCOUNTER — APPOINTMENT (EMERGENCY)
Dept: RADIOLOGY | Facility: HOSPITAL | Age: 69
End: 2024-09-23
Payer: MEDICARE

## 2024-09-23 ENCOUNTER — HOSPITAL ENCOUNTER (EMERGENCY)
Facility: HOSPITAL | Age: 69
Discharge: HOME | End: 2024-09-23
Attending: EMERGENCY MEDICINE
Payer: MEDICARE

## 2024-09-23 VITALS
SYSTOLIC BLOOD PRESSURE: 131 MMHG | RESPIRATION RATE: 18 BRPM | HEART RATE: 62 BPM | TEMPERATURE: 98 F | OXYGEN SATURATION: 97 % | DIASTOLIC BLOOD PRESSURE: 75 MMHG | BODY MASS INDEX: 34.79 KG/M2 | WEIGHT: 178.13 LBS

## 2024-09-23 DIAGNOSIS — R91.1 PULMONARY NODULE: ICD-10-CM

## 2024-09-23 DIAGNOSIS — N39.0 UTI (URINARY TRACT INFECTION): Primary | ICD-10-CM

## 2024-09-23 LAB
ANION GAP SERPL CALCULATED.3IONS-SCNC: 6 MMOL/L (ref 4–13)
BACTERIA UR QL AUTO: ABNORMAL /HPF
BASOPHILS # BLD AUTO: 0.04 THOUSANDS/ΜL (ref 0–0.1)
BASOPHILS NFR BLD AUTO: 1 % (ref 0–1)
BILIRUB UR QL STRIP: NEGATIVE
BUN SERPL-MCNC: 33 MG/DL (ref 5–25)
CALCIUM SERPL-MCNC: 9 MG/DL (ref 8.4–10.2)
CHLORIDE SERPL-SCNC: 108 MMOL/L (ref 96–108)
CLARITY UR: CLEAR
CO2 SERPL-SCNC: 26 MMOL/L (ref 21–32)
COLOR UR: YELLOW
CREAT SERPL-MCNC: 1.3 MG/DL (ref 0.6–1.3)
EOSINOPHIL # BLD AUTO: 0.19 THOUSAND/ΜL (ref 0–0.61)
EOSINOPHIL NFR BLD AUTO: 6 % (ref 0–6)
ERYTHROCYTE [DISTWIDTH] IN BLOOD BY AUTOMATED COUNT: 15.1 % (ref 11.6–15.1)
FLUAV AG UPPER RESP QL IA.RAPID: NEGATIVE
FLUBV AG UPPER RESP QL IA.RAPID: NEGATIVE
GFR SERPL CREATININE-BSD FRML MDRD: 41 ML/MIN/1.73SQ M
GLUCOSE SERPL-MCNC: 98 MG/DL (ref 65–140)
GLUCOSE UR STRIP-MCNC: NEGATIVE MG/DL
HCT VFR BLD AUTO: 32.4 % (ref 34.8–46.1)
HGB BLD-MCNC: 10.8 G/DL (ref 11.5–15.4)
HGB UR QL STRIP.AUTO: ABNORMAL
IMM GRANULOCYTES # BLD AUTO: 0.01 THOUSAND/UL (ref 0–0.2)
IMM GRANULOCYTES NFR BLD AUTO: 0 % (ref 0–2)
KETONES UR STRIP-MCNC: NEGATIVE MG/DL
LEUKOCYTE ESTERASE UR QL STRIP: ABNORMAL
LYMPHOCYTES # BLD AUTO: 0.61 THOUSANDS/ΜL (ref 0.6–4.47)
LYMPHOCYTES NFR BLD AUTO: 18 % (ref 14–44)
MCH RBC QN AUTO: 30.3 PG (ref 26.8–34.3)
MCHC RBC AUTO-ENTMCNC: 33.3 G/DL (ref 31.4–37.4)
MCV RBC AUTO: 91 FL (ref 82–98)
MONOCYTES # BLD AUTO: 0.2 THOUSAND/ΜL (ref 0.17–1.22)
MONOCYTES NFR BLD AUTO: 6 % (ref 4–12)
NEUTROPHILS # BLD AUTO: 2.27 THOUSANDS/ΜL (ref 1.85–7.62)
NEUTS SEG NFR BLD AUTO: 69 % (ref 43–75)
NITRITE UR QL STRIP: NEGATIVE
NON-SQ EPI CELLS URNS QL MICRO: ABNORMAL /HPF
NRBC BLD AUTO-RTO: 0 /100 WBCS
PH UR STRIP.AUTO: 6 [PH]
PLATELET # BLD AUTO: 112 THOUSANDS/UL (ref 149–390)
PMV BLD AUTO: 10.7 FL (ref 8.9–12.7)
POTASSIUM SERPL-SCNC: 4 MMOL/L (ref 3.5–5.3)
PROCALCITONIN SERPL-MCNC: 0.06 NG/ML
PROT UR STRIP-MCNC: ABNORMAL MG/DL
RBC # BLD AUTO: 3.57 MILLION/UL (ref 3.81–5.12)
RBC #/AREA URNS AUTO: ABNORMAL /HPF
S PYO DNA THROAT QL NAA+PROBE: NOT DETECTED
SARS-COV+SARS-COV-2 AG RESP QL IA.RAPID: NEGATIVE
SODIUM SERPL-SCNC: 140 MMOL/L (ref 135–147)
SP GR UR STRIP.AUTO: 1.02 (ref 1–1.03)
UROBILINOGEN UR QL STRIP.AUTO: 0.2 E.U./DL
WBC # BLD AUTO: 3.32 THOUSAND/UL (ref 4.31–10.16)
WBC #/AREA URNS AUTO: ABNORMAL /HPF

## 2024-09-23 PROCEDURE — 85025 COMPLETE CBC W/AUTO DIFF WBC: CPT | Performed by: EMERGENCY MEDICINE

## 2024-09-23 PROCEDURE — 80048 BASIC METABOLIC PNL TOTAL CA: CPT | Performed by: EMERGENCY MEDICINE

## 2024-09-23 PROCEDURE — 96360 HYDRATION IV INFUSION INIT: CPT

## 2024-09-23 PROCEDURE — 99284 EMERGENCY DEPT VISIT MOD MDM: CPT | Performed by: EMERGENCY MEDICINE

## 2024-09-23 PROCEDURE — 74176 CT ABD & PELVIS W/O CONTRAST: CPT

## 2024-09-23 PROCEDURE — 87811 SARS-COV-2 COVID19 W/OPTIC: CPT | Performed by: EMERGENCY MEDICINE

## 2024-09-23 PROCEDURE — 87086 URINE CULTURE/COLONY COUNT: CPT | Performed by: EMERGENCY MEDICINE

## 2024-09-23 PROCEDURE — 36415 COLL VENOUS BLD VENIPUNCTURE: CPT | Performed by: EMERGENCY MEDICINE

## 2024-09-23 PROCEDURE — 87804 INFLUENZA ASSAY W/OPTIC: CPT | Performed by: EMERGENCY MEDICINE

## 2024-09-23 PROCEDURE — 71045 X-RAY EXAM CHEST 1 VIEW: CPT

## 2024-09-23 PROCEDURE — 84145 PROCALCITONIN (PCT): CPT | Performed by: EMERGENCY MEDICINE

## 2024-09-23 PROCEDURE — 81001 URINALYSIS AUTO W/SCOPE: CPT | Performed by: EMERGENCY MEDICINE

## 2024-09-23 PROCEDURE — 99284 EMERGENCY DEPT VISIT MOD MDM: CPT

## 2024-09-23 PROCEDURE — 87651 STREP A DNA AMP PROBE: CPT | Performed by: EMERGENCY MEDICINE

## 2024-09-23 RX ORDER — CEPHALEXIN 500 MG/1
500 CAPSULE ORAL EVERY 12 HOURS SCHEDULED
Qty: 14 CAPSULE | Refills: 0 | Status: SHIPPED | OUTPATIENT
Start: 2024-09-23 | End: 2024-09-30

## 2024-09-23 RX ADMIN — SODIUM CHLORIDE 1000 ML: 0.9 INJECTION, SOLUTION INTRAVENOUS at 09:22

## 2024-09-23 RX ADMIN — CEPHALEXIN 500 MG: 250 CAPSULE ORAL at 14:18

## 2024-09-23 NOTE — ED PROVIDER NOTES
1. UTI (urinary tract infection)    2. Pulmonary nodule      ED Disposition       ED Disposition   Discharge    Condition   Stable    Date/Time   Mon Sep 23, 2024  2:12 PM    Comment   Jamey Gaston discharge to home/self care.                   Assessment & Plan       Medical Decision Making  Differential diagnosis includes but not limited to: Pneumonia, bronchitis, viral syndrome, UTI    Amount and/or Complexity of Data Reviewed  Labs: ordered.  Radiology: ordered.    Risk  Prescription drug management.                ED Course as of 09/23/24 1504   Mon Sep 23, 2024   1400  Daniel had a stable ED course with no acute findings.  She was found to have urinary tract infection for which she is being treated with Keflex in the ED and a prescription for the same.  Patient was insistent that Dr. D'Amico service be notified and I discussed the care with ZAHIRA Gordon.  Patient has a pulmonary nodule which has been present since 2023 and patient is already aware.  She had no other acute findings on her workup and is stable for discharge.       Medications   sodium chloride 0.9 % bolus 1,000 mL (0 mL Intravenous Stopped 9/23/24 1029)   cephalexin (KEFLEX) capsule 500 mg (500 mg Oral Given 9/23/24 1418)       History of Present Illness       This is a 69-year-old female presenting to the ED for evaluation of viral symptoms since August.  Patient states that she tested positive for COVID 2 weeks ago and has been still feeling very ill and getting worse.  She admits to a productive cough.  Patient also states that she is prone to urinary tract infections and recently finished a course of antibiotics for UTI and is now having abdominal pain pelvic pain with frequency, cough congestion and sore throat.  She denies any recent travel or sick contacts.  Patient states that she is a cancer patient and takes oral chemotherapy.        Review of Systems   Constitutional:  Positive for appetite change, chills and fever.    HENT:  Positive for congestion and sore throat. Negative for ear pain.    Eyes:  Negative for pain and visual disturbance.   Respiratory:  Positive for cough and shortness of breath.    Cardiovascular:  Negative for chest pain and palpitations.   Gastrointestinal:  Negative for abdominal pain and vomiting.   Endocrine: Negative.    Genitourinary: Negative.  Negative for dysuria and hematuria.   Musculoskeletal:  Negative for arthralgias and back pain.   Skin:  Negative for color change and rash.   Neurological:  Positive for weakness. Negative for seizures and syncope.   Hematological: Negative.    Psychiatric/Behavioral:  Negative for agitation, behavioral problems, confusion, decreased concentration, dysphoric mood, hallucinations, self-injury and sleep disturbance. The patient is not nervous/anxious and is not hyperactive.    All other systems reviewed and are negative.          Objective     ED Triage Vitals [09/23/24 0826]   Temperature Pulse Blood Pressure Respirations SpO2 Patient Position - Orthostatic VS   98 °F (36.7 °C) 55 111/54 18 98 % Lying      Temp Source Heart Rate Source BP Location FiO2 (%) Pain Score    Oral -- Right arm -- 2        Physical Exam  Vitals and nursing note reviewed.   Constitutional:       General: She is not in acute distress.     Appearance: Normal appearance. She is well-developed and normal weight.   HENT:      Head: Normocephalic and atraumatic.      Right Ear: External ear normal.      Left Ear: External ear normal.      Nose: Nose normal. No rhinorrhea.      Mouth/Throat:      Mouth: Mucous membranes are moist.   Eyes:      Extraocular Movements: Extraocular movements intact.      Conjunctiva/sclera: Conjunctivae normal.   Cardiovascular:      Rate and Rhythm: Normal rate and regular rhythm.      Pulses: Normal pulses.      Heart sounds: Normal heart sounds. No murmur heard.  Pulmonary:      Effort: Pulmonary effort is normal. No respiratory distress.      Breath sounds:  Normal breath sounds. No stridor. No wheezing or rhonchi.   Chest:      Chest wall: No tenderness.   Abdominal:      General: Abdomen is flat. Bowel sounds are normal. There is no distension.      Palpations: Abdomen is soft. There is no mass.      Tenderness: There is no abdominal tenderness. There is no right CVA tenderness, left CVA tenderness, guarding or rebound.      Hernia: No hernia is present.   Musculoskeletal:         General: No swelling, tenderness, deformity or signs of injury. Normal range of motion.      Cervical back: Normal range of motion and neck supple. No rigidity or tenderness.      Right lower leg: No edema.      Left lower leg: No edema.   Lymphadenopathy:      Cervical: No cervical adenopathy.   Skin:     General: Skin is warm and dry.      Capillary Refill: Capillary refill takes less than 2 seconds.      Coloration: Skin is not jaundiced or pale.      Findings: No bruising, erythema, lesion or rash.   Neurological:      General: No focal deficit present.      Mental Status: She is alert and oriented to person, place, and time. Mental status is at baseline.      Cranial Nerves: No cranial nerve deficit.      Sensory: No sensory deficit.      Motor: No weakness.      Coordination: Coordination normal.      Gait: Gait normal.      Deep Tendon Reflexes: Reflexes normal.   Psychiatric:         Mood and Affect: Mood normal.         Labs Reviewed   CBC AND DIFFERENTIAL - Abnormal       Result Value    WBC 3.32 (*)     RBC 3.57 (*)     Hemoglobin 10.8 (*)     Hematocrit 32.4 (*)     MCV 91      MCH 30.3      MCHC 33.3      RDW 15.1      MPV 10.7      Platelets 112 (*)     nRBC 0      Segmented % 69      Immature Grans % 0      Lymphocytes % 18      Monocytes % 6      Eosinophils Relative 6      Basophils Relative 1      Absolute Neutrophils 2.27      Absolute Immature Grans 0.01      Absolute Lymphocytes 0.61      Absolute Monocytes 0.20      Eosinophils Absolute 0.19      Basophils Absolute 0.04      BASIC METABOLIC PANEL - Abnormal    Sodium 140      Potassium 4.0      Chloride 108      CO2 26      ANION GAP 6      BUN 33 (*)     Creatinine 1.30      Glucose 98      Calcium 9.0      eGFR 41      Narrative:     National Kidney Disease Foundation guidelines for Chronic Kidney Disease (CKD):     Stage 1 with normal or high GFR (GFR > 90 mL/min/1.73 square meters)    Stage 2 Mild CKD (GFR = 60-89 mL/min/1.73 square meters)    Stage 3A Moderate CKD (GFR = 45-59 mL/min/1.73 square meters)    Stage 3B Moderate CKD (GFR = 30-44 mL/min/1.73 square meters)    Stage 4 Severe CKD (GFR = 15-29 mL/min/1.73 square meters)    Stage 5 End Stage CKD (GFR <15 mL/min/1.73 square meters)  Note: GFR calculation is accurate only with a steady state creatinine   UA W REFLEX TO MICROSCOPIC WITH REFLEX TO CULTURE - Abnormal    Color, UA Yellow      Clarity, UA Clear      Specific Gravity, UA 1.020      pH, UA 6.0      Leukocytes, UA Trace (*)     Nitrite, UA Negative      Protein, UA Trace (*)     Glucose, UA Negative      Ketones, UA Negative      Urobilinogen, UA 0.2      Bilirubin, UA Negative      Occult Blood, UA Moderate (*)    URINE MICROSCOPIC - Abnormal    RBC, UA 10-20 (*)     WBC, UA 10-20 (*)     Epithelial Cells Occasional      Bacteria, UA Occasional     COVID-19/INFLUENZA A/B RAPID ANTIGEN (30 MIN.TAT) - Normal    SARS COV Rapid Antigen Negative      Influenza A Rapid Antigen Negative      Influenza B Rapid Antigen Negative      Narrative:     This test has been performed using the Quidel Ketty 2 FLU+SARS Antigen test under the Emergency Use Authorization (EUA). This test has been validated by the  and verified by the performing laboratory. The Ketty uses lateral flow immunofluorescent sandwich assay to detect SARS-COV, Influenza A and Influenza B Antigen.     The Quidel Ketty 2 SARS Antigen test does not differentiate between SARS-CoV and SARS-CoV-2.     Negative results are presumptive and may be  confirmed with a molecular assay, if necessary, for patient management. Negative results do not rule out SARS-CoV-2 or influenza infection and should not be used as the sole basis for treatment or patient management decisions. A negative test result may occur if the level of antigen in a sample is below the limit of detection of this test.     Positive results are indicative of the presence of viral antigens, but do not rule out bacterial infection or co-infection with other viruses.     All test results should be used as an adjunct to clinical observations and other information available to the provider.    FOR PEDIATRIC PATIENTS - copy/paste COVID Guidelines URL to browser: [email or web address]   STREP A PCR - Normal    STREP A PCR Not Detected     PROCALCITONIN TEST - Normal    Procalcitonin 0.06     URINE CULTURE     CT renal stone study abdomen pelvis wo contrast   Final Interpretation by Chapo Auguste MD (09/23 3055)      No acute process in the abdomen or pelvis on this noncontrast CT examination. No nephrolithiasis or hydronephrosis. CT urogram can be performed for further assessment of hematuria as clinically warranted.      Stable 7 mm left lower lobe pulmonary nodule, since 6/30/2023. Based on current Fleischner Society 2017 Guidelines on incidental pulmonary nodule, follow-up non-contrast CT is recommended in 6 to 12 months to assess for stability.      The study was marked in EPIC for immediate notification.                           Workstation performed: [id number]         XR chest 1 view portable   Final Interpretation by Danie Azar MD (09/23 3800)      No acute cardiopulmonary disease.            Resident: ADELA CROSS I, the attending radiologist, have reviewed the images and agree with the final report above.      Workstation performed: [id number]             Procedures    ED Medication and Procedure Management   Prior  to Admission Medications   Prescriptions Last Dose Informant Patient Reported? Taking?   B Complex Vitamins (Vitamin B-Complex) TABS   Yes No   Sig: Take 1 tablet by mouth daily   Biotin 5 MG CAPS   Yes No   Sig: Take 1 capsule by mouth   Calcium Carbonate-Vit D-Min (Calcium 1200) 8760-6902 MG-UNIT CHEW   Yes No   Sig: Chew 1 tablet daily   Collagen-Vitamin C-Biotin (Collagen 1500/C) 500-50-0.8 MG CAPS   Yes No   Sig: Take 6 tablets by mouth daily   Magnesium Chloride 64 MG TABS   Yes No   Sig: Take 64 mg by mouth   Omega-3 Fatty Acids (fish oil) 1,000 mg   Yes No   Sig: Take 1,000 mg by mouth daily   VAGINAL LUBRICANT VA   Yes No   Sig: Insert 1 tablet into the vagina Once daily as needed   Verzenio 100 MG TABS   Yes No   acetaminophen (TYLENOL) 325 mg tablet   Yes No   Sig: Take 650 mg by mouth every 4 (four) hours as needed   aspirin (ECOTRIN LOW STRENGTH) 81 mg EC tablet   Yes No   Sig: Take 1 tablet by mouth daily   atorvastatin (LIPITOR) 20 mg tablet   Yes No   Sig: Take by mouth   budesonide-formoterol (SYMBICORT) 160-4.5 mcg/act inhaler   Yes No   Sig: Inhale 2 puffs 2 (two) times a day   carvedilol (COREG) 12.5 mg tablet   Yes No   Sig: Take 12.5 mg by mouth daily   cetirizine (ZyrTEC) 10 mg tablet   Yes No   Sig: Take by mouth   cloNIDine (CATAPRES) 0.1 mg tablet   Yes No   Sig: Take by mouth 2 (two) times a day   letrozole (FEMARA) 2.5 mg tablet   Yes No   Sig: Take 2.5 mg by mouth daily   levothyroxine 100 mcg tablet   Yes No   methenamine hippurate (HIPREX) 1 g tablet   No No   Sig: Take 1 tablet (1 g total) by mouth 2 (two) times a day with meals   montelukast (SINGULAIR) 10 mg tablet   Yes No   Sig: Take by mouth   pantoprazole (PROTONIX) 40 mg tablet   Yes No   Sig: Take 40 mg by mouth daily      Facility-Administered Medications: None     Discharge Medication List as of 9/23/2024  2:14 PM        START taking these medications    Details   cephalexin (KEFLEX) 500 mg capsule Take 1 capsule (500 mg  total) by mouth every 12 (twelve) hours for 7 days, Starting Mon 9/23/2024, Until Mon 9/30/2024, Normal           CONTINUE these medications which have NOT CHANGED    Details   acetaminophen (TYLENOL) 325 mg tablet Take 650 mg by mouth every 4 (four) hours as needed, Historical Med      aspirin (ECOTRIN LOW STRENGTH) 81 mg EC tablet Take 1 tablet by mouth daily, Starting Wed 8/16/2023, Historical Med      atorvastatin (LIPITOR) 20 mg tablet Take by mouth, Starting Fri 3/16/2012, Historical Med      B Complex Vitamins (Vitamin B-Complex) TABS Take 1 tablet by mouth daily, Historical Med      Biotin 5 MG CAPS Take 1 capsule by mouth, Historical Med      budesonide-formoterol (SYMBICORT) 160-4.5 mcg/act inhaler Inhale 2 puffs 2 (two) times a day, Starting Wed 1/8/2020, Historical Med      Calcium Carbonate-Vit D-Min (Calcium 1200) 0913-5312 MG-UNIT CHEW Chew 1 tablet daily, Historical Med      carvedilol (COREG) 12.5 mg tablet Take 12.5 mg by mouth daily, Starting Thu 10/6/2022, Historical Med      cetirizine (ZyrTEC) 10 mg tablet Take by mouth, Historical Med      cloNIDine (CATAPRES) 0.1 mg tablet Take by mouth 2 (two) times a day, Starting Tue 1/30/2018, Historical Med      Collagen-Vitamin C-Biotin (Collagen 1500/C) 500-50-0.8 MG CAPS Take 6 tablets by mouth daily, Historical Med      letrozole (FEMARA) 2.5 mg tablet Take 2.5 mg by mouth daily, Starting Fri 1/12/2024, Until Sat 1/11/2025, Historical Med      levothyroxine 100 mcg tablet Historical Med      Magnesium Chloride 64 MG TABS Take 64 mg by mouth, Historical Med      methenamine hippurate (HIPREX) 1 g tablet Take 1 tablet (1 g total) by mouth 2 (two) times a day with meals, Starting Mon 8/5/2024, Phone In      montelukast (SINGULAIR) 10 mg tablet Take by mouth, Starting Wed 11/13/2019, Historical Med      Omega-3 Fatty Acids (fish oil) 1,000 mg Take 1,000 mg by mouth daily, Historical Med      pantoprazole (PROTONIX) 40 mg tablet Take 40 mg by mouth daily,  Starting Wed 1/8/2020, Until Thu 9/5/2024, Historical Med      VAGINAL LUBRICANT VA Insert 1 tablet into the vagina Once daily as needed, Starting Tue 8/20/2024, Historical Med      Verzenio 100 MG TABS Historical Med           No discharge procedures on file.     Rita Bean, DO  09/23/24 1502

## 2024-09-23 NOTE — DISCHARGE INSTRUCTIONS
Return to the ER immediately for any worsening symptoms.  Please follow-up with your PCP and your urologist for further evaluation and management.

## 2024-09-24 ENCOUNTER — TELEPHONE (OUTPATIENT)
Age: 69
End: 2024-09-24

## 2024-09-24 NOTE — TELEPHONE ENCOUNTER
Patient would like a call back from Nurse Davis in [place] office. Explained I can try to assist. Patient wants to inform Dr. D'Amico that she was in the emergency room yesterday and tested positive for a UTI, started keflex. Culture is pending. Patient is assuming it will be the E.Coli again as her Verzenio treatment has a high incidence of causing UTI's and she has been getting them frequently. Inquiring if she should try something other than the HIPREX for prophylaxis treatment as she has tried 2 rounds of this without benefit.  Her current symptoms are frequency at night, 3-4 times waking up to urinate and urgency.   She is pushing water and aware of bladder irritants to avoid.   Please advise.

## 2024-09-25 LAB — BACTERIA UR CULT: NORMAL

## 2024-09-25 NOTE — TELEPHONE ENCOUNTER
Spoke with patient and she said that yes she is taking the Vitamin C faithfully every day with the Hiprex.

## 2024-09-25 NOTE — TELEPHONE ENCOUNTER
Please ask patient if she is using Vitamin c, 1000mg along with the Hiprex.  This is required to assist in the effectiveness of Hiprex.

## 2024-09-27 NOTE — TELEPHONE ENCOUNTER
Please let patient know that her urine culture did not show signs of infection.  She should continue the hiprex and vitamin c together.  I see her message said frequency, urgency and nocturia.  Has she tried reducing fluid intake prior to bed time.  She

## 2024-10-01 NOTE — TELEPHONE ENCOUNTER
Patient called in again asking for Nurse Susan from [place] to call her. Explained I can try to help.   I relayed providers message:  JESÚS Joyce Nurse Practitioner Signed 9/27/2024     Copy     Please let patient know that her urine culture did not show signs of infection.  She should continue the hiprex and vitamin c together.  I see her message said frequency, urgency and nocturia.  Has she tried reducing fluid intake prior to bed time.  She         Patient states she has decreased fluid intake prior to bedtime, this has not helped and she has been taking hiprex with vitamin C.     Patient is going away next week on Tuesday, asking if she can have a prophylactic antibiotic that she can take along on her trip just in case.

## 2024-10-02 ENCOUNTER — TELEPHONE (OUTPATIENT)
Dept: UROLOGY | Facility: CLINIC | Age: 69
End: 2024-10-02

## 2024-10-02 DIAGNOSIS — R39.9 UTI SYMPTOMS: Primary | ICD-10-CM

## 2024-10-02 RX ORDER — NITROFURANTOIN 25; 75 MG/1; MG/1
100 CAPSULE ORAL 2 TIMES DAILY
Qty: 14 CAPSULE | Refills: 0 | Status: SHIPPED | OUTPATIENT
Start: 2024-10-02

## 2024-10-02 NOTE — TELEPHONE ENCOUNTER
Spoke with pt, she just finished up an antibiotic Keflex that was prescribed from most recent ER visit. She feels better at this time but usually will start with UTI symptoms after each course of antibiotic.  She agrees to the  cath for tomorrow for culture. She will come in at 1000 for a nurse visit.  Dr. D'Amico sent script for macrobid to [facility]. Pt aware.

## 2024-10-03 ENCOUNTER — CLINICAL SUPPORT (OUTPATIENT)
Dept: UROLOGY | Facility: CLINIC | Age: 69
End: 2024-10-03
Payer: MEDICARE

## 2024-10-03 ENCOUNTER — TELEPHONE (OUTPATIENT)
Dept: UROLOGY | Facility: CLINIC | Age: 69
End: 2024-10-03

## 2024-10-03 VITALS
SYSTOLIC BLOOD PRESSURE: 126 MMHG | HEART RATE: 63 BPM | BODY MASS INDEX: 34.04 KG/M2 | OXYGEN SATURATION: 99 % | HEIGHT: 60 IN | TEMPERATURE: 98 F | WEIGHT: 173.4 LBS | DIASTOLIC BLOOD PRESSURE: 60 MMHG

## 2024-10-03 DIAGNOSIS — R39.9 UTI SYMPTOMS: Primary | ICD-10-CM

## 2024-10-03 PROCEDURE — P9612 CATHETERIZE FOR URINE SPEC: HCPCS

## 2024-10-03 PROCEDURE — 87086 URINE CULTURE/COLONY COUNT: CPT | Performed by: UROLOGY

## 2024-10-03 RX ORDER — AMOXICILLIN 500 MG
1 CAPSULE ORAL
COMMUNITY

## 2024-10-03 NOTE — PROGRESS NOTES
Universal Protocol:  procedure performed by consultantConsent: Verbal consent obtained. Written consent not obtained.  Risks and benefits: risks, benefits and alternatives were discussed  Consent given by: patient  Patient understanding: patient states understanding of the procedure being performed  Patient consent: the patient's understanding of the procedure matches consent given  Procedure consent: procedure consent matches procedure scheduled  Patient identity confirmed: verbally with patient    Bladder catheterization    Date/Time: 10/3/2024 10:00 AM    Performed by: Christine Rdz LPN  Authorized by: Frank D'Amico, MD    Patient location:  Bedside  Consent:     Consent given by:  Patient  Universal protocol:     Procedure explained and questions answered to patient or proxy's satisfaction: yes      Patient identity confirmed:  Verbally with patient  Pre-procedure details:     Procedure purpose:  Diagnostic  Procedure details:     Bladder irrigation: no      Catheter insertion:  Non-indwelling    Approach: natural orifice      Catheter size:  14 Fr    Number of attempts:  1    Urine characteristics:  Clear and yellow  Post-procedure details:     Patient tolerance of procedure:  Tolerated well, no immediate complications    Procedure charge update: No charge applicable

## 2024-10-03 NOTE — PROGRESS NOTES
10/3/2024  Jamey Gaston is a 69 y.o. female  [id number]    Diagnosis:  UTI symptoms      Patient presents for routine straight cath managed by Dr. D'Amico    Plan:  St cath for urine culture.   Patient instructed to call with any questions or concerns in the meantime.    No orders of the defined types were placed in this encounter.       Vitals:    10/03/24 1017   BP: 126/60   Pulse: 63   Temp: 98 °F (36.7 °C)   SpO2: 99%   Weight: 78.7 kg (173 lb 6.4 oz)   Height: 5' (1.524 m)           Procedure:    14F female cath inserted without difficulty. Obtained 60cc of clear yellow urine.      Christine Rdz LPN

## 2024-10-04 LAB — BACTERIA UR CULT: NORMAL

## 2024-11-07 ENCOUNTER — APPOINTMENT (OUTPATIENT)
Dept: LAB | Facility: HOSPITAL | Age: 69
End: 2024-11-07
Payer: MEDICARE

## 2024-11-07 DIAGNOSIS — C50.412 CARCINOMA OF UPPER-OUTER QUADRANT OF LEFT BREAST IN FEMALE, ESTROGEN RECEPTOR POSITIVE (HCC): ICD-10-CM

## 2024-11-07 DIAGNOSIS — Z17.0 CARCINOMA OF UPPER-OUTER QUADRANT OF LEFT BREAST IN FEMALE, ESTROGEN RECEPTOR POSITIVE (HCC): ICD-10-CM

## 2024-11-07 LAB
ALBUMIN SERPL BCG-MCNC: 3.9 G/DL (ref 3.5–5)
ALP SERPL-CCNC: 58 U/L (ref 34–104)
ALT SERPL W P-5'-P-CCNC: 17 U/L (ref 7–52)
ANION GAP SERPL CALCULATED.3IONS-SCNC: 8 MMOL/L (ref 4–13)
AST SERPL W P-5'-P-CCNC: 21 U/L (ref 13–39)
BASOPHILS # BLD AUTO: 0.04 THOUSANDS/ÂΜL (ref 0–0.1)
BASOPHILS NFR BLD AUTO: 1 % (ref 0–1)
BILIRUB SERPL-MCNC: 0.44 MG/DL (ref 0.2–1)
BUN SERPL-MCNC: 44 MG/DL (ref 5–25)
CALCIUM SERPL-MCNC: 9.1 MG/DL (ref 8.4–10.2)
CHLORIDE SERPL-SCNC: 107 MMOL/L (ref 96–108)
CO2 SERPL-SCNC: 25 MMOL/L (ref 21–32)
CREAT SERPL-MCNC: 1.34 MG/DL (ref 0.6–1.3)
EOSINOPHIL # BLD AUTO: 0.25 THOUSAND/ÂΜL (ref 0–0.61)
EOSINOPHIL NFR BLD AUTO: 7 % (ref 0–6)
ERYTHROCYTE [DISTWIDTH] IN BLOOD BY AUTOMATED COUNT: 13.4 % (ref 11.6–15.1)
GFR SERPL CREATININE-BSD FRML MDRD: 40 ML/MIN/1.73SQ M
GLUCOSE SERPL-MCNC: 110 MG/DL (ref 65–140)
HCT VFR BLD AUTO: 33.9 % (ref 34.8–46.1)
HGB BLD-MCNC: 11.2 G/DL (ref 11.5–15.4)
IMM GRANULOCYTES # BLD AUTO: 0.01 THOUSAND/UL (ref 0–0.2)
IMM GRANULOCYTES NFR BLD AUTO: 0 % (ref 0–2)
LYMPHOCYTES # BLD AUTO: 1 THOUSANDS/ÂΜL (ref 0.6–4.47)
LYMPHOCYTES NFR BLD AUTO: 27 % (ref 14–44)
MCH RBC QN AUTO: 30.3 PG (ref 26.8–34.3)
MCHC RBC AUTO-ENTMCNC: 33 G/DL (ref 31.4–37.4)
MCV RBC AUTO: 92 FL (ref 82–98)
MONOCYTES # BLD AUTO: 0.34 THOUSAND/ÂΜL (ref 0.17–1.22)
MONOCYTES NFR BLD AUTO: 9 % (ref 4–12)
NEUTROPHILS # BLD AUTO: 2.14 THOUSANDS/ÂΜL (ref 1.85–7.62)
NEUTS SEG NFR BLD AUTO: 56 % (ref 43–75)
NRBC BLD AUTO-RTO: 0 /100 WBCS
PLATELET # BLD AUTO: 109 THOUSANDS/UL (ref 149–390)
PMV BLD AUTO: 11.2 FL (ref 8.9–12.7)
POTASSIUM SERPL-SCNC: 4 MMOL/L (ref 3.5–5.3)
PROT SERPL-MCNC: 6.9 G/DL (ref 6.4–8.4)
RBC # BLD AUTO: 3.7 MILLION/UL (ref 3.81–5.12)
SODIUM SERPL-SCNC: 140 MMOL/L (ref 135–147)
WBC # BLD AUTO: 3.78 THOUSAND/UL (ref 4.31–10.16)

## 2024-11-07 PROCEDURE — 36415 COLL VENOUS BLD VENIPUNCTURE: CPT

## 2024-11-07 PROCEDURE — 80053 COMPREHEN METABOLIC PANEL: CPT

## 2024-11-07 PROCEDURE — 85025 COMPLETE CBC W/AUTO DIFF WBC: CPT

## 2024-11-21 ENCOUNTER — OFFICE VISIT (OUTPATIENT)
Dept: UROLOGY | Facility: CLINIC | Age: 69
End: 2024-11-21
Payer: MEDICARE

## 2024-11-21 VITALS
SYSTOLIC BLOOD PRESSURE: 128 MMHG | TEMPERATURE: 97.4 F | BODY MASS INDEX: 34.32 KG/M2 | OXYGEN SATURATION: 98 % | HEART RATE: 75 BPM | HEIGHT: 60 IN | DIASTOLIC BLOOD PRESSURE: 72 MMHG | WEIGHT: 174.8 LBS

## 2024-11-21 DIAGNOSIS — N39.0 RECURRENT UTI: Primary | ICD-10-CM

## 2024-11-21 PROCEDURE — 99214 OFFICE O/P EST MOD 30 MIN: CPT | Performed by: UROLOGY

## 2024-11-21 RX ORDER — ASCORBIC ACID 500 MG
500 TABLET ORAL 2 TIMES DAILY
COMMUNITY

## 2024-11-21 RX ORDER — ABEMACICLIB 50 MG/1
50 TABLET ORAL 2 TIMES DAILY
COMMUNITY
Start: 2024-11-04

## 2024-11-21 NOTE — PROGRESS NOTES
UROLOGY PROGRESS NOTE         NAME: Jamey Gaston  AGE: 69 y.o. SEX: female  : 1955   MRN: [id number]    DATE: 2024  TIME: 11:54 AM    Assessment and Plan      Impression:   1. Recurrent UTI  Currently doing well.  She does have some nocturia voids roughly 14 ounces throughout the night volumes of 3 ounces plus or minus.     Plan: She will continue the Hip-Sanya vitamin C combination.  This appears to be working well for her.  She will drop urine cultures off if needed.  She does have some Macrobid to use if she needs when she travels.  Otherwise I recommend she gets cultures      Chief Complaint     Chief Complaint   Patient presents with    Follow-up     ;Patient said that she is following up but is doing well. No urinary symptoms.      History of Present Illness     HPI: Jamey Gaston is a 69 y.o. year old female who presents with history of recurrent UTIs and nocturia.  She is cut back on her salt and fluid intake and has had less nocturia.  She typically voids 3 to 6 ounces per night.  She wakes up 3 or 4 times.  Total of 14 ounces.  She would like to avoid taking overactive bladder medication for now.  She is on multiple meds.  She is done well from a UTI standpoint.  The Macrobid was taken a few months ago.  She is currently on Hip-Sanya and vitamin C which appears to be working well for her she is happy with her current situation.              The following portions of the patient's history were reviewed and updated as appropriate: allergies, current medications, past family history, past medical history, past social history, past surgical history and problem list.  Past Medical History:   Diagnosis Date    Allergies     Asthma     Breast CA (HCC)     Cancer (HCC)     Cystocele with prolapse     GERD (gastroesophageal reflux disease)     Hernia of abdominal wall     Hyperlipidemia     Hypertension     Hypothyroidism     Microscopic hematuria     Urinary incontinence     Urinary retention      Urinary tract infection     UTI (urinary tract infection)      Past Surgical History:   Procedure Laterality Date    BLADDER REPAIR      BREAST SURGERY      HERNIA REPAIR      HYSTERECTOMY      TOOTH EXTRACTION       shoulder  Review of Systems     Const: Denies chills, fever and weight loss.  CV: Denies chest pain.  Resp: Denies SOB.  GI: Denies abdominal pain, nausea and vomiting.  : Denies symptoms other than stated above.  Musculo: Denies back pain.    Objective   /72   Pulse 75   Temp (!) 97.4 °F (36.3 °C)   Ht 5' (1.524 m)   Wt 79.3 kg (174 lb 12.8 oz)   SpO2 98%   BMI 34.14 kg/m²     Physical Exam  Const: Appears healthy and well developed. No signs of acute distress present.  Resp: Respirations are regular and unlabored.   CV: Rate is regular. Rhythm is regular.  Abdomen: Abdomen is soft, nontender, and nondistended. Kidneys are not palpable.  : No CVA tenderness.  Psych: Patient's attitude is cooperative. Mood is normal. Affect is normal.    Current Medications     Current Outpatient Medications:     acetaminophen (TYLENOL) 325 mg tablet, Take 650 mg by mouth every 4 (four) hours as needed, Disp: , Rfl:     ascorbic acid (VITAMIN C) 500 MG tablet, Take 500 mg by mouth 2 (two) times a day, Disp: , Rfl:     aspirin (ECOTRIN LOW STRENGTH) 81 mg EC tablet, Take 1 tablet by mouth daily, Disp: , Rfl:     atorvastatin (LIPITOR) 20 mg tablet, Take by mouth, Disp: , Rfl:     B Complex Vitamins (Vitamin B-Complex) TABS, Take 1 tablet by mouth daily, Disp: , Rfl:     Biotin 5 MG CAPS, Take 1 capsule by mouth, Disp: , Rfl:     Calcium Carbonate-Vit D-Min (Calcium 1200) 2252-1669 MG-UNIT CHEW, Chew 1 tablet daily, Disp: , Rfl:     carvedilol (COREG) 12.5 mg tablet, Take 12.5 mg by mouth daily, Disp: , Rfl:     cetirizine (ZyrTEC) 10 mg tablet, Take by mouth, Disp: , Rfl:     cloNIDine (CATAPRES) 0.1 mg tablet, Take by mouth 2 (two) times a day, Disp: , Rfl:     Collagen-Vitamin C-Biotin (Collagen 1500/C)  500-50-0.8 MG CAPS, Take 6 tablets by mouth daily, Disp: , Rfl:     letrozole (FEMARA) 2.5 mg tablet, Take 2.5 mg by mouth daily, Disp: , Rfl:     levothyroxine 100 mcg tablet, Take 100 mcg by mouth daily, Disp: , Rfl:     Magnesium Chloride 64 MG TABS, Take 64 mg by mouth, Disp: , Rfl:     methenamine hippurate (HIPREX) 1 g tablet, Take 1 tablet (1 g total) by mouth 2 (two) times a day with meals, Disp: 180 tablet, Rfl: 11    montelukast (SINGULAIR) 10 mg tablet, Take by mouth, Disp: , Rfl:     Omega-3 Fatty Acids (fish oil) 1,000 mg, Take 1,000 mg by mouth daily, Disp: , Rfl:     pantoprazole (PROTONIX) 40 mg tablet, Take 40 mg by mouth daily, Disp: , Rfl:     Verzenio 50 MG TABS, Take 50 mg by mouth 2 (two) times a day, Disp: , Rfl:     budesonide-formoterol (SYMBICORT) 160-4.5 mcg/act inhaler, Inhale 2 puffs 2 (two) times a day (Patient not taking: Reported on 11/21/2024), Disp: , Rfl:     VAGINAL LUBRICANT VA, Insert 1 tablet into the vagina Once daily as needed (Patient not taking: Reported on 11/21/2024), Disp: , Rfl:         Frank D'Amico, MD

## 2025-01-16 ENCOUNTER — APPOINTMENT (OUTPATIENT)
Dept: LAB | Facility: HOSPITAL | Age: 70
End: 2025-01-16
Payer: MEDICARE

## 2025-01-16 DIAGNOSIS — I51.9 MYXEDEMA HEART DISEASE: ICD-10-CM

## 2025-01-16 DIAGNOSIS — Z17.0 CARCINOMA OF UPPER-OUTER QUADRANT OF LEFT BREAST IN FEMALE, ESTROGEN RECEPTOR POSITIVE (HCC): ICD-10-CM

## 2025-01-16 DIAGNOSIS — E03.9 MYXEDEMA HEART DISEASE: ICD-10-CM

## 2025-01-16 DIAGNOSIS — C50.412 CARCINOMA OF UPPER-OUTER QUADRANT OF LEFT BREAST IN FEMALE, ESTROGEN RECEPTOR POSITIVE (HCC): ICD-10-CM

## 2025-01-16 LAB
ALBUMIN SERPL BCG-MCNC: 4 G/DL (ref 3.5–5)
ALP SERPL-CCNC: 53 U/L (ref 34–104)
ALT SERPL W P-5'-P-CCNC: 17 U/L (ref 7–52)
ANION GAP SERPL CALCULATED.3IONS-SCNC: 5 MMOL/L (ref 4–13)
AST SERPL W P-5'-P-CCNC: 22 U/L (ref 13–39)
BASOPHILS # BLD AUTO: 0.05 THOUSANDS/ΜL (ref 0–0.1)
BASOPHILS NFR BLD AUTO: 1 % (ref 0–1)
BILIRUB SERPL-MCNC: 0.58 MG/DL (ref 0.2–1)
BUN SERPL-MCNC: 45 MG/DL (ref 5–25)
CALCIUM SERPL-MCNC: 9.6 MG/DL (ref 8.4–10.2)
CHLORIDE SERPL-SCNC: 106 MMOL/L (ref 96–108)
CO2 SERPL-SCNC: 28 MMOL/L (ref 21–32)
CREAT SERPL-MCNC: 1.44 MG/DL (ref 0.6–1.3)
EOSINOPHIL # BLD AUTO: 0.24 THOUSAND/ΜL (ref 0–0.61)
EOSINOPHIL NFR BLD AUTO: 7 % (ref 0–6)
ERYTHROCYTE [DISTWIDTH] IN BLOOD BY AUTOMATED COUNT: 14 % (ref 11.6–15.1)
GFR SERPL CREATININE-BSD FRML MDRD: 36 ML/MIN/1.73SQ M
GLUCOSE SERPL-MCNC: 92 MG/DL (ref 65–140)
HCT VFR BLD AUTO: 35 % (ref 34.8–46.1)
HGB BLD-MCNC: 11.4 G/DL (ref 11.5–15.4)
IMM GRANULOCYTES # BLD AUTO: 0.01 THOUSAND/UL (ref 0–0.2)
IMM GRANULOCYTES NFR BLD AUTO: 0 % (ref 0–2)
LYMPHOCYTES # BLD AUTO: 0.78 THOUSANDS/ΜL (ref 0.6–4.47)
LYMPHOCYTES NFR BLD AUTO: 22 % (ref 14–44)
MCH RBC QN AUTO: 30.5 PG (ref 26.8–34.3)
MCHC RBC AUTO-ENTMCNC: 32.6 G/DL (ref 31.4–37.4)
MCV RBC AUTO: 94 FL (ref 82–98)
MONOCYTES # BLD AUTO: 0.37 THOUSAND/ΜL (ref 0.17–1.22)
MONOCYTES NFR BLD AUTO: 11 % (ref 4–12)
NEUTROPHILS # BLD AUTO: 2.04 THOUSANDS/ΜL (ref 1.85–7.62)
NEUTS SEG NFR BLD AUTO: 59 % (ref 43–75)
NRBC BLD AUTO-RTO: 0 /100 WBCS
PLATELET # BLD AUTO: 100 THOUSANDS/UL (ref 149–390)
PMV BLD AUTO: 10.8 FL (ref 8.9–12.7)
POTASSIUM SERPL-SCNC: 4.3 MMOL/L (ref 3.5–5.3)
PROT SERPL-MCNC: 6.8 G/DL (ref 6.4–8.4)
RBC # BLD AUTO: 3.74 MILLION/UL (ref 3.81–5.12)
SODIUM SERPL-SCNC: 139 MMOL/L (ref 135–147)
TSH SERPL DL<=0.05 MIU/L-ACNC: 3.34 UIU/ML (ref 0.45–4.5)
WBC # BLD AUTO: 3.49 THOUSAND/UL (ref 4.31–10.16)

## 2025-01-16 PROCEDURE — 80053 COMPREHEN METABOLIC PANEL: CPT

## 2025-01-16 PROCEDURE — 36415 COLL VENOUS BLD VENIPUNCTURE: CPT

## 2025-01-16 PROCEDURE — 84443 ASSAY THYROID STIM HORMONE: CPT

## 2025-01-16 PROCEDURE — 85025 COMPLETE CBC W/AUTO DIFF WBC: CPT

## 2025-04-28 ENCOUNTER — APPOINTMENT (OUTPATIENT)
Dept: LAB | Facility: HOSPITAL | Age: 70
End: 2025-04-28
Payer: MEDICARE

## 2025-04-28 DIAGNOSIS — C50.412 CARCINOMA OF UPPER-OUTER QUADRANT OF LEFT BREAST IN FEMALE, ESTROGEN RECEPTOR POSITIVE (HCC): ICD-10-CM

## 2025-04-28 DIAGNOSIS — E78.2 MIXED HYPERLIPIDEMIA: ICD-10-CM

## 2025-04-28 DIAGNOSIS — Z17.0 CARCINOMA OF UPPER-OUTER QUADRANT OF LEFT BREAST IN FEMALE, ESTROGEN RECEPTOR POSITIVE (HCC): ICD-10-CM

## 2025-04-28 LAB
ALBUMIN SERPL BCG-MCNC: 4 G/DL (ref 3.5–5)
ALP SERPL-CCNC: 55 U/L (ref 34–104)
ALT SERPL W P-5'-P-CCNC: 14 U/L (ref 7–52)
ANION GAP SERPL CALCULATED.3IONS-SCNC: 4 MMOL/L (ref 4–13)
AST SERPL W P-5'-P-CCNC: 21 U/L (ref 13–39)
BASOPHILS # BLD AUTO: 0.05 THOUSANDS/ÂΜL (ref 0–0.1)
BASOPHILS NFR BLD AUTO: 2 % (ref 0–1)
BILIRUB SERPL-MCNC: 0.76 MG/DL (ref 0.2–1)
BUN SERPL-MCNC: 27 MG/DL (ref 5–25)
CALCIUM SERPL-MCNC: 9.6 MG/DL (ref 8.4–10.2)
CHLORIDE SERPL-SCNC: 102 MMOL/L (ref 96–108)
CHOLEST SERPL-MCNC: 185 MG/DL (ref ?–200)
CO2 SERPL-SCNC: 30 MMOL/L (ref 21–32)
CREAT SERPL-MCNC: 1.34 MG/DL (ref 0.6–1.3)
EOSINOPHIL # BLD AUTO: 0.29 THOUSAND/ÂΜL (ref 0–0.61)
EOSINOPHIL NFR BLD AUTO: 11 % (ref 0–6)
ERYTHROCYTE [DISTWIDTH] IN BLOOD BY AUTOMATED COUNT: 13.4 % (ref 11.6–15.1)
GFR SERPL CREATININE-BSD FRML MDRD: 39 ML/MIN/1.73SQ M
GLUCOSE P FAST SERPL-MCNC: 89 MG/DL (ref 65–99)
HCT VFR BLD AUTO: 35.7 % (ref 34.8–46.1)
HDLC SERPL-MCNC: 81 MG/DL
HGB BLD-MCNC: 11.7 G/DL (ref 11.5–15.4)
IMM GRANULOCYTES # BLD AUTO: 0.01 THOUSAND/UL (ref 0–0.2)
IMM GRANULOCYTES NFR BLD AUTO: 0 % (ref 0–2)
LDLC SERPL CALC-MCNC: 84 MG/DL (ref 0–100)
LYMPHOCYTES # BLD AUTO: 0.67 THOUSANDS/ÂΜL (ref 0.6–4.47)
LYMPHOCYTES NFR BLD AUTO: 25 % (ref 14–44)
MCH RBC QN AUTO: 30.5 PG (ref 26.8–34.3)
MCHC RBC AUTO-ENTMCNC: 32.8 G/DL (ref 31.4–37.4)
MCV RBC AUTO: 93 FL (ref 82–98)
MONOCYTES # BLD AUTO: 0.28 THOUSAND/ÂΜL (ref 0.17–1.22)
MONOCYTES NFR BLD AUTO: 11 % (ref 4–12)
NEUTROPHILS # BLD AUTO: 1.37 THOUSANDS/ÂΜL (ref 1.85–7.62)
NEUTS SEG NFR BLD AUTO: 51 % (ref 43–75)
NONHDLC SERPL-MCNC: 104 MG/DL
NRBC BLD AUTO-RTO: 0 /100 WBCS
PLATELET # BLD AUTO: 108 THOUSANDS/UL (ref 149–390)
PMV BLD AUTO: 10.9 FL (ref 8.9–12.7)
POTASSIUM SERPL-SCNC: 4.1 MMOL/L (ref 3.5–5.3)
PROT SERPL-MCNC: 6.8 G/DL (ref 6.4–8.4)
RBC # BLD AUTO: 3.83 MILLION/UL (ref 3.81–5.12)
SODIUM SERPL-SCNC: 136 MMOL/L (ref 135–147)
TRIGL SERPL-MCNC: 102 MG/DL (ref ?–150)
WBC # BLD AUTO: 2.67 THOUSAND/UL (ref 4.31–10.16)

## 2025-04-28 PROCEDURE — 85025 COMPLETE CBC W/AUTO DIFF WBC: CPT

## 2025-04-28 PROCEDURE — 80053 COMPREHEN METABOLIC PANEL: CPT

## 2025-04-28 PROCEDURE — 36415 COLL VENOUS BLD VENIPUNCTURE: CPT

## 2025-04-28 PROCEDURE — 80061 LIPID PANEL: CPT

## 2025-08-07 ENCOUNTER — CLINICAL SUPPORT (OUTPATIENT)
Dept: NUTRITION | Facility: CLINIC | Age: 70
End: 2025-08-07
Payer: MEDICARE

## 2025-08-07 ENCOUNTER — APPOINTMENT (OUTPATIENT)
Dept: LAB | Facility: HOSPITAL | Age: 70
End: 2025-08-07
Payer: MEDICARE

## 2025-08-07 VITALS — HEIGHT: 59 IN | BODY MASS INDEX: 34.64 KG/M2 | WEIGHT: 171.8 LBS

## 2025-08-07 DIAGNOSIS — N18.32 STAGE 3B CHRONIC KIDNEY DISEASE (CKD) (HCC): Primary | ICD-10-CM

## 2025-08-07 DIAGNOSIS — E78.2 MIXED HYPERLIPIDEMIA: ICD-10-CM

## 2025-08-07 DIAGNOSIS — C50.412 CARCINOMA OF UPPER-OUTER QUADRANT OF LEFT BREAST IN FEMALE, ESTROGEN RECEPTOR POSITIVE (HCC): ICD-10-CM

## 2025-08-07 DIAGNOSIS — E66.89 OTHER OBESITY NOT ELSEWHERE CLASSIFIED: ICD-10-CM

## 2025-08-07 DIAGNOSIS — Z17.0 CARCINOMA OF UPPER-OUTER QUADRANT OF LEFT BREAST IN FEMALE, ESTROGEN RECEPTOR POSITIVE (HCC): ICD-10-CM

## 2025-08-07 LAB
ALBUMIN SERPL BCG-MCNC: 4.1 G/DL (ref 3.5–5)
ALP SERPL-CCNC: 55 U/L (ref 34–104)
ALT SERPL W P-5'-P-CCNC: 18 U/L (ref 7–52)
ANION GAP SERPL CALCULATED.3IONS-SCNC: 6 MMOL/L (ref 4–13)
AST SERPL W P-5'-P-CCNC: 22 U/L (ref 13–39)
BASOPHILS # BLD AUTO: 0.04 THOUSANDS/ÂΜL (ref 0–0.1)
BASOPHILS NFR BLD AUTO: 2 % (ref 0–1)
BILIRUB SERPL-MCNC: 0.61 MG/DL (ref 0.2–1)
BUN SERPL-MCNC: 34 MG/DL (ref 5–25)
CALCIUM SERPL-MCNC: 9.6 MG/DL (ref 8.4–10.2)
CHLORIDE SERPL-SCNC: 106 MMOL/L (ref 96–108)
CO2 SERPL-SCNC: 28 MMOL/L (ref 21–32)
CREAT SERPL-MCNC: 1.33 MG/DL (ref 0.6–1.3)
EOSINOPHIL # BLD AUTO: 0.2 THOUSAND/ÂΜL (ref 0–0.61)
EOSINOPHIL NFR BLD AUTO: 8 % (ref 0–6)
ERYTHROCYTE [DISTWIDTH] IN BLOOD BY AUTOMATED COUNT: 14.2 % (ref 11.6–15.1)
GFR SERPL CREATININE-BSD FRML MDRD: 40 ML/MIN/1.73SQ M
GLUCOSE SERPL-MCNC: 99 MG/DL (ref 65–140)
HCT VFR BLD AUTO: 35.2 % (ref 34.8–46.1)
HGB BLD-MCNC: 11.7 G/DL (ref 11.5–15.4)
IMM GRANULOCYTES # BLD AUTO: 0.01 THOUSAND/UL (ref 0–0.2)
IMM GRANULOCYTES NFR BLD AUTO: 0 % (ref 0–2)
LYMPHOCYTES # BLD AUTO: 0.67 THOUSANDS/ÂΜL (ref 0.6–4.47)
LYMPHOCYTES NFR BLD AUTO: 25 % (ref 14–44)
MCH RBC QN AUTO: 31.5 PG (ref 26.8–34.3)
MCHC RBC AUTO-ENTMCNC: 33.2 G/DL (ref 31.4–37.4)
MCV RBC AUTO: 95 FL (ref 82–98)
MONOCYTES # BLD AUTO: 0.3 THOUSAND/ÂΜL (ref 0.17–1.22)
MONOCYTES NFR BLD AUTO: 11 % (ref 4–12)
NEUTROPHILS # BLD AUTO: 1.45 THOUSANDS/ÂΜL (ref 1.85–7.62)
NEUTS SEG NFR BLD AUTO: 54 % (ref 43–75)
NRBC BLD AUTO-RTO: 0 /100 WBCS
PLATELET # BLD AUTO: 95 THOUSANDS/UL (ref 149–390)
PMV BLD AUTO: 10.4 FL (ref 8.9–12.7)
POTASSIUM SERPL-SCNC: 4.3 MMOL/L (ref 3.5–5.3)
PROT SERPL-MCNC: 6.9 G/DL (ref 6.4–8.4)
RBC # BLD AUTO: 3.71 MILLION/UL (ref 3.81–5.12)
SODIUM SERPL-SCNC: 140 MMOL/L (ref 135–147)
WBC # BLD AUTO: 2.67 THOUSAND/UL (ref 4.31–10.16)

## 2025-08-07 PROCEDURE — 97802 MEDICAL NUTRITION INDIV IN: CPT | Performed by: DIETITIAN, REGISTERED

## 2025-08-07 PROCEDURE — 36415 COLL VENOUS BLD VENIPUNCTURE: CPT

## 2025-08-07 PROCEDURE — 80053 COMPREHEN METABOLIC PANEL: CPT

## 2025-08-07 PROCEDURE — 85025 COMPLETE CBC W/AUTO DIFF WBC: CPT

## 2025-08-13 ENCOUNTER — NURSE TRIAGE (OUTPATIENT)
Age: 70
End: 2025-08-13